# Patient Record
Sex: MALE | Race: WHITE | NOT HISPANIC OR LATINO | Employment: FULL TIME | URBAN - METROPOLITAN AREA
[De-identification: names, ages, dates, MRNs, and addresses within clinical notes are randomized per-mention and may not be internally consistent; named-entity substitution may affect disease eponyms.]

---

## 2017-01-01 ENCOUNTER — OFFICE VISIT (OUTPATIENT)
Dept: FAMILY MEDICINE | Facility: CLINIC | Age: 52
End: 2017-01-01
Payer: COMMERCIAL

## 2017-01-01 VITALS
TEMPERATURE: 99 F | HEART RATE: 110 BPM | WEIGHT: 133.81 LBS | DIASTOLIC BLOOD PRESSURE: 70 MMHG | SYSTOLIC BLOOD PRESSURE: 120 MMHG | OXYGEN SATURATION: 98 % | BODY MASS INDEX: 19.82 KG/M2 | HEIGHT: 69 IN

## 2017-01-01 DIAGNOSIS — I10 ESSENTIAL HYPERTENSION: Primary | ICD-10-CM

## 2017-01-01 DIAGNOSIS — I25.10 CORONARY ARTERY DISEASE INVOLVING NATIVE CORONARY ARTERY WITHOUT ANGINA PECTORIS, UNSPECIFIED WHETHER NATIVE OR TRANSPLANTED HEART: ICD-10-CM

## 2017-01-01 DIAGNOSIS — Z12.11 COLON CANCER SCREENING: ICD-10-CM

## 2017-01-01 DIAGNOSIS — I10 HYPERTENSION, UNSPECIFIED TYPE: ICD-10-CM

## 2017-01-01 DIAGNOSIS — I21.4 NSTEMI (NON-ST ELEVATED MYOCARDIAL INFARCTION): ICD-10-CM

## 2017-01-01 DIAGNOSIS — E78.5 HYPERLIPIDEMIA, UNSPECIFIED HYPERLIPIDEMIA TYPE: ICD-10-CM

## 2017-01-01 PROCEDURE — 99999 PR PBB SHADOW E&M-NEW PATIENT-LVL III: CPT | Mod: PBBFAC,,, | Performed by: NURSE PRACTITIONER

## 2017-01-01 PROCEDURE — 99203 OFFICE O/P NEW LOW 30 MIN: CPT | Mod: S$GLB,,, | Performed by: NURSE PRACTITIONER

## 2017-01-01 RX ORDER — ASPIRIN 81 MG/1
81 TABLET ORAL DAILY
Status: ON HOLD | COMMUNITY
End: 2018-01-01 | Stop reason: HOSPADM

## 2017-11-09 NOTE — PROGRESS NOTES
Subjective:       Patient ID: Teddy Caballero is a 51 y.o. male.    Chief Complaint: Establish Care (Doctor at work inform pt need primary physican to release pt back to work)    ####NEW PATIENT###    Patient is a 51 year old white male here today to establish care with PCP.  He states he needs a work note clearing him to return to work.  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    Patient reports he was admitted into hospital on 11/7/14 with NSTEMI.  Patient had Left Heart Cath with Dr. Wei on 11/10/17 with stent placement to LAD.  He was discharged home on ASA 81 mg daily, Effient 10 mg daily, Lisinopril 5 mg daily, Atorvastatin 80 mg daily and Lopressor 12.5 mg twice daily.  Patient reports he has not been on any medications for the past year.    Patient reports he has an appointment with Dr. Wei tomorrow - advised patient that he must get cardiac clearance from Dr. Wei to work and find out what medications Dr. Wei wants patient to be on and see if he will run blood work or not tomorrow.  I will get Dr. Wei's records from Nov. 2014 and will follow up with patient in couple weeks to review what Dr. Wei has planned.            Previous Medications    ASPIRIN (ECOTRIN) 81 MG EC TABLET    Take 81 mg by mouth once daily.    MULTIVITAMIN CAPSULE    Take 1 capsule by mouth once daily.       Past Medical History:   Diagnosis Date    Coronary artery disease 11/2014    stent x 1 in Nov. 2014    Hyperlipidemia     Hypertension        Past Surgical History:   Procedure Laterality Date    CORONARY ANGIOPLASTY WITH STENT PLACEMENT  11/2014    dr. Wei    stent placed  11/07/2014    on placed.       Family History   Problem Relation Age of Onset    Heart disease Mother      CABG x 3 vessels - 55    Diabetes Mother     Stroke Mother     Heart disease Father       CABG x 4 - age 40    Cancer Brother 29     colon,liver     Cancer Brother      gastric passed age 43       Social History     Social History    Marital status:      Spouse name: N/A    Number of children: N/A    Years of education: N/A     Occupational History    security at [employer]      Social History Main Topics    Smoking status: Current Every Day Smoker     Packs/day: 0.25     Years: 30.00     Types: Cigarettes    Smokeless tobacco: Never Used    Alcohol use Yes      Comment: every weekend - 4 beers    Drug use: No    Sexual activity: Not Asked     Other Topics Concern    None     Social History Narrative    None       Review of Systems   Constitutional: Negative for activity change, appetite change, fatigue, fever and unexpected weight change.   HENT: Negative for congestion, ear pain, mouth sores, nosebleeds, postnasal drip, rhinorrhea, sinus pressure, sneezing, sore throat, trouble swallowing and voice change.    Eyes: Negative.    Respiratory: Negative for cough, chest tightness and shortness of breath.    Cardiovascular: Negative for chest pain, palpitations and leg swelling.   Gastrointestinal: Negative.  Negative for abdominal pain, blood in stool, constipation, diarrhea, nausea and vomiting.   Endocrine: Negative.    Genitourinary: Negative for difficulty urinating, dysuria, flank pain, hematuria and urgency.   Musculoskeletal: Negative for arthralgias, back pain, gait problem, joint swelling, myalgias and neck pain.   Skin: Negative for color change, rash and wound.   Allergic/Immunologic: Negative for immunocompromised state.   Neurological: Negative for dizziness, tremors, seizures, syncope, speech difficulty and headaches.   Hematological: Negative for adenopathy. Does not bruise/bleed easily.   Psychiatric/Behavioral: Negative for behavioral problems, dysphoric mood, sleep disturbance and suicidal ideas. The patient is not nervous/anxious.          Objective:  "    Vitals:    11/09/17 1348   BP: 120/70   BP Location: Right arm   Patient Position: Sitting   BP Method: Medium (Manual)   Pulse: 110   Temp: 99 °F (37.2 °C)   TempSrc: Oral   SpO2: 98%   Weight: 60.7 kg (133 lb 13.1 oz)   Height: 5' 9" (1.753 m)          Physical Exam   Constitutional: He is oriented to person, place, and time. He appears well-developed and well-nourished.   HENT:   Head: Normocephalic.   Right Ear: External ear normal.   Left Ear: External ear normal.   Nose: Nose normal.   Mouth/Throat: Oropharynx is clear and moist. No oropharyngeal exudate.   Eyes: EOM are normal. Pupils are equal, round, and reactive to light. Right eye exhibits no discharge. Left eye exhibits no discharge. No scleral icterus.   Neck: Normal range of motion. Neck supple. No tracheal deviation present. No thyromegaly present.   Cardiovascular: Normal rate, regular rhythm and normal heart sounds.    No murmur heard.  Pulmonary/Chest: Effort normal and breath sounds normal. No respiratory distress.   Abdominal: Soft. He exhibits no distension.   Musculoskeletal: Normal range of motion. He exhibits no edema.   Lymphadenopathy:     He has no cervical adenopathy.   Neurological: He is alert and oriented to person, place, and time. Coordination normal.   Skin: Skin is warm and dry. No rash noted.   Psychiatric: He has a normal mood and affect. His behavior is normal.         Assessment:         ICD-10-CM ICD-9-CM   1. Essential hypertension I10 401.9   2. Coronary artery disease involving native coronary artery without angina pectoris, unspecified whether native or transplanted heart I25.10 414.01   3. Hyperlipidemia, unspecified hyperlipidemia type E78.5 272.4   4. NSTEMI (non-ST elevated myocardial infarction) I21.4 410.70   5. Hypertension, unspecified type I10 401.9       Plan:       Essential hypertension  -  Keep appointment with Dr. Wei IN AM - for evaluation and management of medications and cardiac clearance for " work.    Coronary artery disease involving native coronary artery without angina pectoris, unspecified whether native or transplanted heart  -  Keep appointment with Dr. Wei in AM for cardiac clearance for work.    Hyperlipidemia, unspecified hyperlipidemia type  -  I assume Dr. Wei will run labs tomorrow - I will reassess what Dr. Wei plans after tomorrow's visit.    NSTEMI (non-ST elevated myocardial infarction)  -  Had stent to LAD Nov. 2014          Return in about 4 weeks (around 12/7/2017) for follow up.     Patient's Medications   New Prescriptions    No medications on file   Previous Medications    ASPIRIN (ECOTRIN) 81 MG EC TABLET    Take 81 mg by mouth once daily.    MULTIVITAMIN CAPSULE    Take 1 capsule by mouth once daily.   Modified Medications    No medications on file   Discontinued Medications    HYDROCODONE-ACETAMINOPHEN 5-325MG (NORCO) 5-325 MG PER TABLET    Take 1 tablet by mouth every 4 (four) hours as needed for Pain.

## 2018-01-01 ENCOUNTER — TELEPHONE (OUTPATIENT)
Dept: ENDOSCOPY | Facility: HOSPITAL | Age: 53
End: 2018-01-01

## 2018-01-01 ENCOUNTER — TELEPHONE (OUTPATIENT)
Dept: HEMATOLOGY/ONCOLOGY | Facility: CLINIC | Age: 53
End: 2018-01-01

## 2018-01-01 ENCOUNTER — PATIENT MESSAGE (OUTPATIENT)
Dept: PULMONOLOGY | Facility: HOSPITAL | Age: 53
End: 2018-01-01

## 2018-01-01 ENCOUNTER — PATIENT MESSAGE (OUTPATIENT)
Dept: GASTROENTEROLOGY | Facility: CLINIC | Age: 53
End: 2018-01-01

## 2018-01-01 ENCOUNTER — SURGERY (OUTPATIENT)
Age: 53
End: 2018-01-01

## 2018-01-01 ENCOUNTER — OFFICE VISIT (OUTPATIENT)
Dept: GASTROENTEROLOGY | Facility: CLINIC | Age: 53
End: 2018-01-01
Payer: COMMERCIAL

## 2018-01-01 ENCOUNTER — PATIENT MESSAGE (OUTPATIENT)
Dept: FAMILY MEDICINE | Facility: CLINIC | Age: 53
End: 2018-01-01

## 2018-01-01 ENCOUNTER — LAB VISIT (OUTPATIENT)
Dept: LAB | Facility: HOSPITAL | Age: 53
End: 2018-01-01
Attending: INTERNAL MEDICINE
Payer: COMMERCIAL

## 2018-01-01 ENCOUNTER — CLINICAL SUPPORT (OUTPATIENT)
Dept: HEMATOLOGY/ONCOLOGY | Facility: CLINIC | Age: 53
End: 2018-01-01
Payer: COMMERCIAL

## 2018-01-01 ENCOUNTER — INFUSION (OUTPATIENT)
Dept: INFUSION THERAPY | Facility: HOSPITAL | Age: 53
End: 2018-01-01
Attending: INTERNAL MEDICINE
Payer: COMMERCIAL

## 2018-01-01 ENCOUNTER — TELEPHONE (OUTPATIENT)
Dept: GASTROENTEROLOGY | Facility: CLINIC | Age: 53
End: 2018-01-01

## 2018-01-01 ENCOUNTER — PATIENT MESSAGE (OUTPATIENT)
Dept: PULMONOLOGY | Facility: CLINIC | Age: 53
End: 2018-01-01

## 2018-01-01 ENCOUNTER — PATIENT MESSAGE (OUTPATIENT)
Dept: HEMATOLOGY/ONCOLOGY | Facility: CLINIC | Age: 53
End: 2018-01-01

## 2018-01-01 ENCOUNTER — OFFICE VISIT (OUTPATIENT)
Dept: HEMATOLOGY/ONCOLOGY | Facility: CLINIC | Age: 53
End: 2018-01-01
Payer: COMMERCIAL

## 2018-01-01 ENCOUNTER — HOSPITAL ENCOUNTER (OUTPATIENT)
Dept: RADIOLOGY | Facility: HOSPITAL | Age: 53
Discharge: HOME | End: 2018-02-26
Attending: INTERNAL MEDICINE
Payer: COMMERCIAL

## 2018-01-01 ENCOUNTER — DOCUMENTATION ONLY (OUTPATIENT)
Dept: HEMATOLOGY/ONCOLOGY | Facility: CLINIC | Age: 53
End: 2018-01-01

## 2018-01-01 ENCOUNTER — HOSPITAL ENCOUNTER (OUTPATIENT)
Facility: HOSPITAL | Age: 53
Discharge: HOME | End: 2018-03-06
Attending: INTERNAL MEDICINE | Admitting: INTERNAL MEDICINE
Payer: COMMERCIAL

## 2018-01-01 ENCOUNTER — OFFICE VISIT (OUTPATIENT)
Dept: FAMILY MEDICINE | Facility: CLINIC | Age: 53
End: 2018-01-01
Payer: COMMERCIAL

## 2018-01-01 ENCOUNTER — OFFICE VISIT (OUTPATIENT)
Dept: PULMONOLOGY | Facility: CLINIC | Age: 53
End: 2018-01-01
Payer: COMMERCIAL

## 2018-01-01 ENCOUNTER — HOSPITAL ENCOUNTER (INPATIENT)
Facility: HOSPITAL | Age: 53
LOS: 5 days | DRG: 871 | End: 2018-04-21
Attending: EMERGENCY MEDICINE | Admitting: INTERNAL MEDICINE
Payer: COMMERCIAL

## 2018-01-01 ENCOUNTER — RESEARCH ENCOUNTER (OUTPATIENT)
Dept: RESEARCH | Facility: HOSPITAL | Age: 53
End: 2018-01-01

## 2018-01-01 ENCOUNTER — ANESTHESIA EVENT (OUTPATIENT)
Dept: SURGERY | Facility: HOSPITAL | Age: 53
End: 2018-01-01
Payer: COMMERCIAL

## 2018-01-01 ENCOUNTER — ANESTHESIA (OUTPATIENT)
Dept: SURGERY | Facility: HOSPITAL | Age: 53
End: 2018-01-01
Payer: COMMERCIAL

## 2018-01-01 ENCOUNTER — HOSPITAL ENCOUNTER (OUTPATIENT)
Facility: HOSPITAL | Age: 53
Discharge: HOME | End: 2018-03-23
Attending: INTERNAL MEDICINE | Admitting: INTERNAL MEDICINE
Payer: COMMERCIAL

## 2018-01-01 ENCOUNTER — DOCUMENTATION ONLY (OUTPATIENT)
Dept: GASTROENTEROLOGY | Facility: CLINIC | Age: 53
End: 2018-01-01

## 2018-01-01 VITALS
WEIGHT: 87.31 LBS | DIASTOLIC BLOOD PRESSURE: 67 MMHG | OXYGEN SATURATION: 93 % | SYSTOLIC BLOOD PRESSURE: 96 MMHG | HEART RATE: 63 BPM | RESPIRATION RATE: 12 BRPM | HEIGHT: 69 IN | TEMPERATURE: 97 F | BODY MASS INDEX: 12.93 KG/M2

## 2018-01-01 VITALS
HEART RATE: 80 BPM | TEMPERATURE: 98 F | RESPIRATION RATE: 18 BRPM | DIASTOLIC BLOOD PRESSURE: 67 MMHG | WEIGHT: 99 LBS | HEIGHT: 69 IN | SYSTOLIC BLOOD PRESSURE: 109 MMHG | BODY MASS INDEX: 14.66 KG/M2 | OXYGEN SATURATION: 100 %

## 2018-01-01 VITALS
HEART RATE: 100 BPM | WEIGHT: 91.5 LBS | OXYGEN SATURATION: 100 % | HEIGHT: 69 IN | BODY MASS INDEX: 13.55 KG/M2 | SYSTOLIC BLOOD PRESSURE: 83 MMHG | RESPIRATION RATE: 18 BRPM | DIASTOLIC BLOOD PRESSURE: 54 MMHG | TEMPERATURE: 98 F

## 2018-01-01 VITALS
SYSTOLIC BLOOD PRESSURE: 108 MMHG | HEIGHT: 69 IN | HEART RATE: 110 BPM | BODY MASS INDEX: 15.67 KG/M2 | WEIGHT: 105.81 LBS | OXYGEN SATURATION: 99 % | DIASTOLIC BLOOD PRESSURE: 68 MMHG

## 2018-01-01 VITALS
RESPIRATION RATE: 16 BRPM | HEIGHT: 69 IN | SYSTOLIC BLOOD PRESSURE: 107 MMHG | TEMPERATURE: 98 F | WEIGHT: 108.69 LBS | DIASTOLIC BLOOD PRESSURE: 78 MMHG | HEART RATE: 96 BPM | BODY MASS INDEX: 16.1 KG/M2

## 2018-01-01 VITALS — HEIGHT: 69 IN | BODY MASS INDEX: 14.69 KG/M2 | WEIGHT: 99.19 LBS

## 2018-01-01 VITALS
DIASTOLIC BLOOD PRESSURE: 77 MMHG | WEIGHT: 118.63 LBS | HEIGHT: 69 IN | BODY MASS INDEX: 17.57 KG/M2 | HEART RATE: 91 BPM | SYSTOLIC BLOOD PRESSURE: 117 MMHG

## 2018-01-01 VITALS
HEART RATE: 102 BPM | TEMPERATURE: 99 F | DIASTOLIC BLOOD PRESSURE: 70 MMHG | WEIGHT: 123.44 LBS | SYSTOLIC BLOOD PRESSURE: 114 MMHG | BODY MASS INDEX: 18.28 KG/M2 | HEIGHT: 69 IN | OXYGEN SATURATION: 100 %

## 2018-01-01 VITALS
HEART RATE: 76 BPM | BODY MASS INDEX: 17.18 KG/M2 | SYSTOLIC BLOOD PRESSURE: 114 MMHG | WEIGHT: 116 LBS | HEIGHT: 69 IN | OXYGEN SATURATION: 100 % | DIASTOLIC BLOOD PRESSURE: 79 MMHG | TEMPERATURE: 97 F | HEART RATE: 90 BPM | SYSTOLIC BLOOD PRESSURE: 104 MMHG | RESPIRATION RATE: 18 BRPM | RESPIRATION RATE: 18 BRPM | TEMPERATURE: 98 F | DIASTOLIC BLOOD PRESSURE: 58 MMHG

## 2018-01-01 VITALS
BODY MASS INDEX: 14.69 KG/M2 | SYSTOLIC BLOOD PRESSURE: 95 MMHG | HEART RATE: 97 BPM | TEMPERATURE: 98 F | DIASTOLIC BLOOD PRESSURE: 63 MMHG | OXYGEN SATURATION: 99 % | HEIGHT: 69 IN | RESPIRATION RATE: 18 BRPM | WEIGHT: 99.19 LBS

## 2018-01-01 VITALS
HEART RATE: 77 BPM | SYSTOLIC BLOOD PRESSURE: 109 MMHG | TEMPERATURE: 98 F | DIASTOLIC BLOOD PRESSURE: 51 MMHG | RESPIRATION RATE: 14 BRPM

## 2018-01-01 DIAGNOSIS — Z51.11 ENCOUNTER FOR CHEMOTHERAPY MANAGEMENT: ICD-10-CM

## 2018-01-01 DIAGNOSIS — Z80.0 FAMILY HISTORY OF GASTRIC CANCER: ICD-10-CM

## 2018-01-01 DIAGNOSIS — C77.1 METASTATIC CANCER TO INTRATHORACIC LYMPH NODES: ICD-10-CM

## 2018-01-01 DIAGNOSIS — C80.1 ADENOCARCINOMA: ICD-10-CM

## 2018-01-01 DIAGNOSIS — R63.0 DECREASED APPETITE: ICD-10-CM

## 2018-01-01 DIAGNOSIS — Z71.3 NUTRITIONAL COUNSELING: Primary | ICD-10-CM

## 2018-01-01 DIAGNOSIS — G89.3 NEOPLASM RELATED PAIN (ACUTE) (CHRONIC): ICD-10-CM

## 2018-01-01 DIAGNOSIS — C34.92 NON-SMALL CELL CANCER OF LEFT LUNG: ICD-10-CM

## 2018-01-01 DIAGNOSIS — C78.7 LIVER METASTASIS: ICD-10-CM

## 2018-01-01 DIAGNOSIS — C79.70 MALIGNANT NEOPLASM METASTATIC TO ADRENAL GLAND, UNSPECIFIED LATERALITY: ICD-10-CM

## 2018-01-01 DIAGNOSIS — N28.89 RENAL MASS: ICD-10-CM

## 2018-01-01 DIAGNOSIS — R41.82 ALTERED MENTAL STATUS: ICD-10-CM

## 2018-01-01 DIAGNOSIS — C80.1 ADENOCARCINOMA: Primary | ICD-10-CM

## 2018-01-01 DIAGNOSIS — K59.00 CONSTIPATION, UNSPECIFIED CONSTIPATION TYPE: ICD-10-CM

## 2018-01-01 DIAGNOSIS — C34.92 NON-SMALL CELL CANCER OF LEFT LUNG: Primary | ICD-10-CM

## 2018-01-01 DIAGNOSIS — R10.9 ABDOMINAL PAIN, UNSPECIFIED ABDOMINAL LOCATION: Primary | ICD-10-CM

## 2018-01-01 DIAGNOSIS — R93.5 ABNORMAL CT OF THE ABDOMEN: Primary | ICD-10-CM

## 2018-01-01 DIAGNOSIS — R91.8 LUNG MASS: ICD-10-CM

## 2018-01-01 DIAGNOSIS — R63.4 WEIGHT LOSS: ICD-10-CM

## 2018-01-01 DIAGNOSIS — R91.8 LUNG MASS: Primary | ICD-10-CM

## 2018-01-01 DIAGNOSIS — R19.4 CHANGE IN BOWEL HABIT: ICD-10-CM

## 2018-01-01 DIAGNOSIS — K76.9 LIVER LESION: ICD-10-CM

## 2018-01-01 DIAGNOSIS — Z12.11 SPECIAL SCREENING FOR MALIGNANT NEOPLASMS, COLON: Primary | ICD-10-CM

## 2018-01-01 DIAGNOSIS — C34.90 NON-SMALL CELL LUNG CANCER, UNSPECIFIED LATERALITY: ICD-10-CM

## 2018-01-01 DIAGNOSIS — C78.7 LIVER METASTASIS: Primary | ICD-10-CM

## 2018-01-01 DIAGNOSIS — L89.91: Primary | ICD-10-CM

## 2018-01-01 DIAGNOSIS — G89.3 PAIN, CANCER: ICD-10-CM

## 2018-01-01 DIAGNOSIS — C78.7 LIVER METASTASES: ICD-10-CM

## 2018-01-01 DIAGNOSIS — R10.10 UPPER ABDOMINAL PAIN: ICD-10-CM

## 2018-01-01 DIAGNOSIS — Z80.0 FAMILY HISTORY OF COLON CANCER: ICD-10-CM

## 2018-01-01 DIAGNOSIS — D63.0 ANEMIA IN NEOPLASTIC DISEASE: ICD-10-CM

## 2018-01-01 DIAGNOSIS — H93.8X9 SENSATION OF FULLNESS IN EAR, UNSPECIFIED LATERALITY: Primary | ICD-10-CM

## 2018-01-01 DIAGNOSIS — E27.8 ADRENAL MASS: ICD-10-CM

## 2018-01-01 DIAGNOSIS — G89.3 NEOPLASM RELATED PAIN (ACUTE) (CHRONIC): Primary | ICD-10-CM

## 2018-01-01 DIAGNOSIS — R10.13 EPIGASTRIC PAIN: Primary | ICD-10-CM

## 2018-01-01 DIAGNOSIS — R59.0 PERIAORTIC LYMPHADENOPATHY: Primary | ICD-10-CM

## 2018-01-01 DIAGNOSIS — E43 SEVERE MALNUTRITION: ICD-10-CM

## 2018-01-01 DIAGNOSIS — R10.10 UPPER ABDOMINAL PAIN: Primary | ICD-10-CM

## 2018-01-01 DIAGNOSIS — E16.2 HYPOGLYCEMIA: Primary | ICD-10-CM

## 2018-01-01 DIAGNOSIS — N28.89 KIDNEY MASS: ICD-10-CM

## 2018-01-01 DIAGNOSIS — Z12.11 SCREEN FOR COLON CANCER: ICD-10-CM

## 2018-01-01 DIAGNOSIS — G89.3 PAIN, CANCER: Primary | ICD-10-CM

## 2018-01-01 DIAGNOSIS — C34.90 NON-SMALL CELL LUNG CANCER, UNSPECIFIED LATERALITY: Primary | ICD-10-CM

## 2018-01-01 LAB
ACETOHEXAMIDE SERPL-MCNC: NEGATIVE NG/ML
ALBUMIN SERPL BCP-MCNC: 1.8 G/DL
ALBUMIN SERPL BCP-MCNC: 1.9 G/DL
ALBUMIN SERPL BCP-MCNC: 2 G/DL
ALBUMIN SERPL BCP-MCNC: 2.3 G/DL
ALBUMIN SERPL BCP-MCNC: 2.5 G/DL
ALBUMIN SERPL BCP-MCNC: 2.9 G/DL
ALBUMIN SERPL BCP-MCNC: 3.4 G/DL
ALBUMIN SERPL BCP-MCNC: 3.4 G/DL
ALP SERPL-CCNC: 102 U/L
ALP SERPL-CCNC: 105 U/L
ALP SERPL-CCNC: 113 U/L
ALP SERPL-CCNC: 121 U/L
ALP SERPL-CCNC: 146 U/L
ALP SERPL-CCNC: 72 U/L
ALP SERPL-CCNC: 80 U/L
ALP SERPL-CCNC: 87 U/L
ALT SERPL W/O P-5'-P-CCNC: 10 U/L
ALT SERPL W/O P-5'-P-CCNC: 10 U/L
ALT SERPL W/O P-5'-P-CCNC: 12 U/L
ALT SERPL W/O P-5'-P-CCNC: 69 U/L
ALT SERPL W/O P-5'-P-CCNC: 73 U/L
ALT SERPL W/O P-5'-P-CCNC: 76 U/L
ALT SERPL W/O P-5'-P-CCNC: 78 U/L
ALT SERPL W/O P-5'-P-CCNC: 88 U/L
ANION GAP SERPL CALC-SCNC: 10 MMOL/L
ANION GAP SERPL CALC-SCNC: 11 MMOL/L
ANION GAP SERPL CALC-SCNC: 11 MMOL/L
ANION GAP SERPL CALC-SCNC: 5 MMOL/L
ANION GAP SERPL CALC-SCNC: 6 MMOL/L
ANION GAP SERPL CALC-SCNC: 7 MMOL/L
ANION GAP SERPL CALC-SCNC: 8 MMOL/L
ANISOCYTOSIS BLD QL SMEAR: SLIGHT
ANISOCYTOSIS BLD QL SMEAR: SLIGHT
ANNOTATION COMMENT IMP: NORMAL
APTT BLDCRRT: 27.4 SEC
AST SERPL-CCNC: 13 U/L
AST SERPL-CCNC: 16 U/L
AST SERPL-CCNC: 16 U/L
AST SERPL-CCNC: 29 U/L
AST SERPL-CCNC: 30 U/L
AST SERPL-CCNC: 31 U/L
AST SERPL-CCNC: 45 U/L
AST SERPL-CCNC: 78 U/L
B-OH-BUTYR BLD STRIP-SCNC: 0.1 MMOL/L
BACTERIA BLD CULT: NORMAL
BACTERIA BLD CULT: NORMAL
BASOPHILS # BLD AUTO: 0.01 K/UL
BASOPHILS # BLD AUTO: 0.02 K/UL
BASOPHILS NFR BLD: 0.1 %
BASOPHILS NFR BLD: 0.2 %
BILIRUB SERPL-MCNC: 0.5 MG/DL
BILIRUB SERPL-MCNC: 0.8 MG/DL
BILIRUB SERPL-MCNC: 0.9 MG/DL
BILIRUB SERPL-MCNC: 0.9 MG/DL
BILIRUB UR QL STRIP: NEGATIVE
BUN SERPL-MCNC: 33 MG/DL
BUN SERPL-MCNC: 45 MG/DL
BUN SERPL-MCNC: 47 MG/DL
BUN SERPL-MCNC: 48 MG/DL
BUN SERPL-MCNC: 53 MG/DL
BUN SERPL-MCNC: 58 MG/DL
BUN SERPL-MCNC: 75 MG/DL
BUN SERPL-MCNC: 80 MG/DL
BURR CELLS BLD QL SMEAR: ABNORMAL
BURR CELLS BLD QL SMEAR: ABNORMAL
C PEPTIDE SERPL-MCNC: 0.1 NG/ML
CALCIUM SERPL-MCNC: 11 MG/DL
CALCIUM SERPL-MCNC: 11 MG/DL
CALCIUM SERPL-MCNC: 7.3 MG/DL
CALCIUM SERPL-MCNC: 7.4 MG/DL
CALCIUM SERPL-MCNC: 7.5 MG/DL
CALCIUM SERPL-MCNC: 7.9 MG/DL
CALCIUM SERPL-MCNC: 9.8 MG/DL
CHLORIDE SERPL-SCNC: 100 MMOL/L
CHLORIDE SERPL-SCNC: 109 MMOL/L
CHLORIDE SERPL-SCNC: 111 MMOL/L
CHLORIDE SERPL-SCNC: 114 MMOL/L
CHLORIDE SERPL-SCNC: 115 MMOL/L
CHLORIDE SERPL-SCNC: 115 MMOL/L
CHLORIDE SERPL-SCNC: 116 MMOL/L
CHLORIDE SERPL-SCNC: 117 MMOL/L
CHLORIDE SERPL-SCNC: 91 MMOL/L
CHLORIDE SERPL-SCNC: 93 MMOL/L
CHLORPROPAMIDE SERPL-MCNC: NEGATIVE NG/ML
CLARITY UR REFRACT.AUTO: CLEAR
CO2 SERPL-SCNC: 18 MMOL/L
CO2 SERPL-SCNC: 19 MMOL/L
CO2 SERPL-SCNC: 20 MMOL/L
CO2 SERPL-SCNC: 20 MMOL/L
CO2 SERPL-SCNC: 21 MMOL/L
CO2 SERPL-SCNC: 25 MMOL/L
CO2 SERPL-SCNC: 27 MMOL/L
COLOR UR AUTO: YELLOW
CORTIS F SERPL-MCNC: 16.68 MCG/DL
CORTIS SERPL-MCNC: 29.5 UG/DL
CREAT SERPL-MCNC: 0.5 MG/DL
CREAT SERPL-MCNC: 0.7 MG/DL
CREAT SERPL-MCNC: 0.7 MG/DL
CREAT SERPL-MCNC: 0.8 MG/DL
CREAT SERPL-MCNC: 1.2 MG/DL
CREAT SERPL-MCNC: 1.4 MG/DL
DIFFERENTIAL METHOD: ABNORMAL
EOSINOPHIL # BLD AUTO: 0.1 K/UL
EOSINOPHIL # BLD AUTO: 0.3 K/UL
EOSINOPHIL NFR BLD: 0.6 %
EOSINOPHIL NFR BLD: 0.6 %
EOSINOPHIL NFR BLD: 0.8 %
EOSINOPHIL NFR BLD: 0.9 %
EOSINOPHIL NFR BLD: 2.7 %
ERYTHROCYTE [DISTWIDTH] IN BLOOD BY AUTOMATED COUNT: 16.2 %
ERYTHROCYTE [DISTWIDTH] IN BLOOD BY AUTOMATED COUNT: 16.3 %
ERYTHROCYTE [DISTWIDTH] IN BLOOD BY AUTOMATED COUNT: 17.5 %
ERYTHROCYTE [DISTWIDTH] IN BLOOD BY AUTOMATED COUNT: 18.8 %
ERYTHROCYTE [DISTWIDTH] IN BLOOD BY AUTOMATED COUNT: 18.9 %
ERYTHROCYTE [DISTWIDTH] IN BLOOD BY AUTOMATED COUNT: 19.3 %
ERYTHROCYTE [DISTWIDTH] IN BLOOD BY AUTOMATED COUNT: 19.4 %
ERYTHROCYTE [DISTWIDTH] IN BLOOD BY AUTOMATED COUNT: 19.6 %
EST. GFR  (AFRICAN AMERICAN): >60 ML/MIN/1.73 M^2
EST. GFR  (NON AFRICAN AMERICAN): 57.8 ML/MIN/1.73 M^2
EST. GFR  (NON AFRICAN AMERICAN): >60 ML/MIN/1.73 M^2
ESTIMATED AVG GLUCOSE: 97 MG/DL
FIBRINOGEN PPP-MCNC: 311 MG/DL
GLIMEPIRIDE SERPL-MCNC: NEGATIVE NG/ML
GLIPIZIDE SERPL-MCNC: NEGATIVE NG/ML
GLUCOSE SERPL-MCNC: 101 MG/DL
GLUCOSE SERPL-MCNC: 103 MG/DL
GLUCOSE SERPL-MCNC: 109 MG/DL (ref 70–110)
GLUCOSE SERPL-MCNC: 128 MG/DL (ref 70–110)
GLUCOSE SERPL-MCNC: 131 MG/DL
GLUCOSE SERPL-MCNC: 52 MG/DL
GLUCOSE SERPL-MCNC: 70 MG/DL
GLUCOSE SERPL-MCNC: 76 MG/DL
GLUCOSE SERPL-MCNC: 79 MG/DL
GLUCOSE SERPL-MCNC: 88 MG/DL
GLUCOSE SERPL-MCNC: 91 MG/DL
GLUCOSE SERPL-MCNC: 97 MG/DL
GLUCOSE UR QL STRIP: NEGATIVE
GLYBURIDE SERPL-MCNC: NEGATIVE NG/ML
HBA1C MFR BLD HPLC: 5 %
HCT VFR BLD AUTO: 33.1 %
HCT VFR BLD AUTO: 33.2 %
HCT VFR BLD AUTO: 35.5 %
HCT VFR BLD AUTO: 38 %
HCT VFR BLD AUTO: 38.9 %
HCT VFR BLD AUTO: 39.1 %
HCT VFR BLD AUTO: 39.6 %
HCT VFR BLD AUTO: 41.6 %
HGB BLD-MCNC: 11.5 G/DL
HGB BLD-MCNC: 11.9 G/DL
HGB BLD-MCNC: 12.3 G/DL
HGB BLD-MCNC: 13 G/DL
HGB BLD-MCNC: 13 G/DL
HGB BLD-MCNC: 13.6 G/DL
HGB BLD-MCNC: 13.7 G/DL
HGB BLD-MCNC: 14.2 G/DL
HGB UR QL STRIP: ABNORMAL
HYALINE CASTS UR QL AUTO: 16 /LPF
HYPOCHROMIA BLD QL SMEAR: ABNORMAL
HYPOCHROMIA BLD QL SMEAR: ABNORMAL
IGF-I SERPL-MCNC: <10 NG/ML (ref 37–245)
IGF-I Z-SCORE SERPL: <-2.33 SD
IMM GRANULOCYTES # BLD AUTO: 0.06 K/UL
IMM GRANULOCYTES # BLD AUTO: 0.06 K/UL
IMM GRANULOCYTES # BLD AUTO: 0.07 K/UL
IMM GRANULOCYTES # BLD AUTO: 0.11 K/UL
IMM GRANULOCYTES # BLD AUTO: 0.18 K/UL
IMM GRANULOCYTES # BLD AUTO: 0.19 K/UL
IMM GRANULOCYTES # BLD AUTO: 0.21 K/UL
IMM GRANULOCYTES # BLD AUTO: 0.27 K/UL
IMM GRANULOCYTES NFR BLD AUTO: 1 %
IMM GRANULOCYTES NFR BLD AUTO: 1.3 %
IMM GRANULOCYTES NFR BLD AUTO: 1.5 %
IMM GRANULOCYTES NFR BLD AUTO: 1.5 %
IMM GRANULOCYTES NFR BLD AUTO: 1.8 %
INR PPP: 1.3
INR PPP: 1.5
INSULIN COLLECTION INTERVAL: NORMAL
INSULIN SERPL-ACNC: <1 UU/ML
KETONES UR QL STRIP: NEGATIVE
LACTATE SERPL-SCNC: 1.3 MMOL/L
LEUKOCYTE ESTERASE UR QL STRIP: NEGATIVE
LYMPHOCYTES # BLD AUTO: 0.7 K/UL
LYMPHOCYTES # BLD AUTO: 0.8 K/UL
LYMPHOCYTES # BLD AUTO: 0.9 K/UL
LYMPHOCYTES # BLD AUTO: 0.9 K/UL
LYMPHOCYTES # BLD AUTO: 1.1 K/UL
LYMPHOCYTES NFR BLD: 5.9 %
LYMPHOCYTES NFR BLD: 6.3 %
LYMPHOCYTES NFR BLD: 6.6 %
LYMPHOCYTES NFR BLD: 6.6 %
LYMPHOCYTES NFR BLD: 7.5 %
MAGNESIUM SERPL-MCNC: 1.8 MG/DL
MAGNESIUM SERPL-MCNC: 1.8 MG/DL
MAGNESIUM SERPL-MCNC: 2 MG/DL
MAGNESIUM SERPL-MCNC: 2.1 MG/DL
MAGNESIUM SERPL-MCNC: 2.1 MG/DL
MCH RBC QN AUTO: 29.2 PG
MCH RBC QN AUTO: 30.2 PG
MCH RBC QN AUTO: 30.3 PG
MCH RBC QN AUTO: 30.9 PG
MCH RBC QN AUTO: 30.9 PG
MCH RBC QN AUTO: 31 PG
MCH RBC QN AUTO: 31 PG
MCH RBC QN AUTO: 31.1 PG
MCHC RBC AUTO-ENTMCNC: 33.4 G/DL
MCHC RBC AUTO-ENTMCNC: 34.1 G/DL
MCHC RBC AUTO-ENTMCNC: 34.2 G/DL
MCHC RBC AUTO-ENTMCNC: 34.3 G/DL
MCHC RBC AUTO-ENTMCNC: 34.6 G/DL
MCHC RBC AUTO-ENTMCNC: 34.7 G/DL
MCHC RBC AUTO-ENTMCNC: 35 G/DL
MCHC RBC AUTO-ENTMCNC: 35.8 G/DL
MCV RBC AUTO: 85 FL
MCV RBC AUTO: 86 FL
MCV RBC AUTO: 89 FL
MCV RBC AUTO: 91 FL
MCV RBC AUTO: 91 FL
MICROSCOPIC COMMENT: ABNORMAL
MONOCYTES # BLD AUTO: 0.4 K/UL
MONOCYTES # BLD AUTO: 0.4 K/UL
MONOCYTES # BLD AUTO: 0.5 K/UL
MONOCYTES NFR BLD: 3.1 %
MONOCYTES NFR BLD: 3.2 %
MONOCYTES NFR BLD: 3.7 %
MONOCYTES NFR BLD: 3.7 %
MONOCYTES NFR BLD: 4.2 %
NEUTROPHILS # BLD AUTO: 10 K/UL
NEUTROPHILS # BLD AUTO: 10.6 K/UL
NEUTROPHILS # BLD AUTO: 10.8 K/UL
NEUTROPHILS # BLD AUTO: 11.4 K/UL
NEUTROPHILS # BLD AUTO: 12.3 K/UL
NEUTROPHILS # BLD AUTO: 12.4 K/UL
NEUTROPHILS # BLD AUTO: 13.1 K/UL
NEUTROPHILS # BLD AUTO: 9.9 K/UL
NEUTROPHILS NFR BLD: 85.9 %
NEUTROPHILS NFR BLD: 87.1 %
NEUTROPHILS NFR BLD: 87.4 %
NEUTROPHILS NFR BLD: 87.5 %
NEUTROPHILS NFR BLD: 88 %
NITRITE UR QL STRIP: NEGATIVE
NRBC BLD-RTO: 0 /100 WBC
OVALOCYTES BLD QL SMEAR: ABNORMAL
OVALOCYTES BLD QL SMEAR: ABNORMAL
PH UR STRIP: 5 [PH] (ref 5–8)
PHOSPHATE SERPL-MCNC: 2.1 MG/DL
PHOSPHATE SERPL-MCNC: 2.2 MG/DL
PHOSPHATE SERPL-MCNC: 2.4 MG/DL
PHOSPHATE SERPL-MCNC: 2.6 MG/DL
PHOSPHATE SERPL-MCNC: 3.3 MG/DL
PLATELET # BLD AUTO: 108 K/UL
PLATELET # BLD AUTO: 113 K/UL
PLATELET # BLD AUTO: 301 K/UL
PLATELET # BLD AUTO: 371 K/UL
PLATELET # BLD AUTO: 449 K/UL
PLATELET # BLD AUTO: 60 K/UL
PLATELET # BLD AUTO: 69 K/UL
PLATELET # BLD AUTO: 71 K/UL
PMV BLD AUTO: 10.1 FL
PMV BLD AUTO: 10.8 FL
PMV BLD AUTO: 11.9 FL
PMV BLD AUTO: 12.1 FL
PMV BLD AUTO: 12.5 FL
PMV BLD AUTO: 12.5 FL
PMV BLD AUTO: 9.7 FL
PMV BLD AUTO: ABNORMAL FL
POCT GLUCOSE: 100 MG/DL (ref 70–110)
POCT GLUCOSE: 102 MG/DL (ref 70–110)
POCT GLUCOSE: 104 MG/DL (ref 70–110)
POCT GLUCOSE: 104 MG/DL (ref 70–110)
POCT GLUCOSE: 105 MG/DL (ref 70–110)
POCT GLUCOSE: 112 MG/DL (ref 70–110)
POCT GLUCOSE: 112 MG/DL (ref 70–110)
POCT GLUCOSE: 115 MG/DL (ref 70–110)
POCT GLUCOSE: 127 MG/DL (ref 70–110)
POCT GLUCOSE: 127 MG/DL (ref 70–110)
POCT GLUCOSE: 132 MG/DL (ref 70–110)
POCT GLUCOSE: 138 MG/DL (ref 70–110)
POCT GLUCOSE: 139 MG/DL (ref 70–110)
POCT GLUCOSE: 146 MG/DL (ref 70–110)
POCT GLUCOSE: 175 MG/DL (ref 70–110)
POCT GLUCOSE: 176 MG/DL (ref 70–110)
POCT GLUCOSE: 252 MG/DL (ref 70–110)
POCT GLUCOSE: 43 MG/DL (ref 70–110)
POCT GLUCOSE: 46 MG/DL (ref 70–110)
POCT GLUCOSE: 50 MG/DL (ref 70–110)
POCT GLUCOSE: 58 MG/DL (ref 70–110)
POCT GLUCOSE: 58 MG/DL (ref 70–110)
POCT GLUCOSE: 59 MG/DL (ref 70–110)
POCT GLUCOSE: 59 MG/DL (ref 70–110)
POCT GLUCOSE: 61 MG/DL (ref 70–110)
POCT GLUCOSE: 62 MG/DL (ref 70–110)
POCT GLUCOSE: 63 MG/DL (ref 70–110)
POCT GLUCOSE: 66 MG/DL (ref 70–110)
POCT GLUCOSE: 67 MG/DL (ref 70–110)
POCT GLUCOSE: 70 MG/DL (ref 70–110)
POCT GLUCOSE: 71 MG/DL (ref 70–110)
POCT GLUCOSE: 72 MG/DL (ref 70–110)
POCT GLUCOSE: 73 MG/DL (ref 70–110)
POCT GLUCOSE: 73 MG/DL (ref 70–110)
POCT GLUCOSE: 75 MG/DL (ref 70–110)
POCT GLUCOSE: 75 MG/DL (ref 70–110)
POCT GLUCOSE: 76 MG/DL (ref 70–110)
POCT GLUCOSE: 80 MG/DL (ref 70–110)
POCT GLUCOSE: 81 MG/DL (ref 70–110)
POCT GLUCOSE: 82 MG/DL (ref 70–110)
POCT GLUCOSE: 83 MG/DL (ref 70–110)
POCT GLUCOSE: 84 MG/DL (ref 70–110)
POCT GLUCOSE: 84 MG/DL (ref 70–110)
POCT GLUCOSE: 85 MG/DL (ref 70–110)
POCT GLUCOSE: 86 MG/DL (ref 70–110)
POCT GLUCOSE: 87 MG/DL (ref 70–110)
POCT GLUCOSE: 88 MG/DL (ref 70–110)
POCT GLUCOSE: 88 MG/DL (ref 70–110)
POCT GLUCOSE: 89 MG/DL (ref 70–110)
POCT GLUCOSE: 94 MG/DL (ref 70–110)
POCT GLUCOSE: 98 MG/DL (ref 70–110)
POIKILOCYTOSIS BLD QL SMEAR: SLIGHT
POIKILOCYTOSIS BLD QL SMEAR: SLIGHT
POLYCHROMASIA BLD QL SMEAR: ABNORMAL
POLYCHROMASIA BLD QL SMEAR: ABNORMAL
POTASSIUM SERPL-SCNC: 3.6 MMOL/L
POTASSIUM SERPL-SCNC: 3.7 MMOL/L
POTASSIUM SERPL-SCNC: 3.7 MMOL/L
POTASSIUM SERPL-SCNC: 3.8 MMOL/L
POTASSIUM SERPL-SCNC: 3.8 MMOL/L
POTASSIUM SERPL-SCNC: 3.9 MMOL/L
POTASSIUM SERPL-SCNC: 4 MMOL/L
POTASSIUM SERPL-SCNC: 4.5 MMOL/L
POTASSIUM SERPL-SCNC: 5.5 MMOL/L
POTASSIUM SERPL-SCNC: 5.5 MMOL/L
PROINSULIN SERPL-SCNC: 2.2 PMOL/L (ref 3.6–22)
PROT SERPL-MCNC: 4.3 G/DL
PROT SERPL-MCNC: 4.4 G/DL
PROT SERPL-MCNC: 4.6 G/DL
PROT SERPL-MCNC: 4.8 G/DL
PROT SERPL-MCNC: 5.4 G/DL
PROT SERPL-MCNC: 6.6 G/DL
PROT SERPL-MCNC: 7.9 G/DL
PROT SERPL-MCNC: 8.2 G/DL
PROT UR QL STRIP: NEGATIVE
PROTHROMBIN TIME: 13.4 SEC
PROTHROMBIN TIME: 14.7 SEC
RBC # BLD AUTO: 3.71 M/UL
RBC # BLD AUTO: 3.85 M/UL
RBC # BLD AUTO: 3.98 M/UL
RBC # BLD AUTO: 4.18 M/UL
RBC # BLD AUTO: 4.3 M/UL
RBC # BLD AUTO: 4.42 M/UL
RBC # BLD AUTO: 4.65 M/UL
RBC # BLD AUTO: 4.68 M/UL
RBC #/AREA URNS AUTO: 2 /HPF (ref 0–4)
REPAGLINIDE SERPL-MCNC: NEGATIVE NG/ML
SCHISTOCYTES BLD QL SMEAR: ABNORMAL
SCHISTOCYTES BLD QL SMEAR: ABNORMAL
SODIUM SERPL-SCNC: 127 MMOL/L
SODIUM SERPL-SCNC: 131 MMOL/L
SODIUM SERPL-SCNC: 131 MMOL/L
SODIUM SERPL-SCNC: 136 MMOL/L
SODIUM SERPL-SCNC: 137 MMOL/L
SODIUM SERPL-SCNC: 138 MMOL/L
SODIUM SERPL-SCNC: 139 MMOL/L
SODIUM SERPL-SCNC: 140 MMOL/L
SODIUM SERPL-SCNC: 141 MMOL/L
SODIUM SERPL-SCNC: 141 MMOL/L
SP GR UR STRIP: 1.02 (ref 1–1.03)
T4 FREE SERPL-MCNC: 1.03 NG/DL
TOLAZAMIDE SERPL-MCNC: NORMAL NG/ML
TOLBUTAMIDE SERPL-MCNC: NEGATIVE NG/ML
TSH SERPL DL<=0.005 MIU/L-ACNC: 0.3 UIU/ML
URATE CRY UR QL COMP ASSIST: ABNORMAL
URN SPEC COLLECT METH UR: ABNORMAL
UROBILINOGEN UR STRIP-ACNC: NEGATIVE EU/DL
WBC # BLD AUTO: 11.53 K/UL
WBC # BLD AUTO: 12.12 K/UL
WBC # BLD AUTO: 13.05 K/UL
WBC # BLD AUTO: 14.11 K/UL
WBC # BLD AUTO: 14.2 K/UL
WBC # BLD AUTO: 14.34 K/UL
WBC # BLD AUTO: 14.76 K/UL
WBC # BLD AUTO: 14.84 K/UL
WBC #/AREA URNS AUTO: 0 /HPF (ref 0–5)

## 2018-01-01 PROCEDURE — 96375 TX/PRO/DX INJ NEW DRUG ADDON: CPT

## 2018-01-01 PROCEDURE — 99205 OFFICE O/P NEW HI 60 MIN: CPT | Mod: S$GLB,,, | Performed by: INTERNAL MEDICINE

## 2018-01-01 PROCEDURE — 99215 OFFICE O/P EST HI 40 MIN: CPT | Mod: S$GLB,,, | Performed by: INTERNAL MEDICINE

## 2018-01-01 PROCEDURE — 71250 CT THORAX DX C-: CPT | Mod: 26,,, | Performed by: RADIOLOGY

## 2018-01-01 PROCEDURE — 31625 BRONCHOSCOPY W/BIOPSY(S): CPT | Mod: 59,LT,, | Performed by: INTERNAL MEDICINE

## 2018-01-01 PROCEDURE — 80048 BASIC METABOLIC PNL TOTAL CA: CPT

## 2018-01-01 PROCEDURE — 20600001 HC STEP DOWN PRIVATE ROOM

## 2018-01-01 PROCEDURE — 92610 EVALUATE SWALLOWING FUNCTION: CPT

## 2018-01-01 PROCEDURE — 99233 SBSQ HOSP IP/OBS HIGH 50: CPT | Mod: ,,, | Performed by: INTERNAL MEDICINE

## 2018-01-01 PROCEDURE — 63600175 PHARM REV CODE 636 W HCPCS: Performed by: EMERGENCY MEDICINE

## 2018-01-01 PROCEDURE — S5010 5% DEXTROSE AND 0.45% SALINE: HCPCS | Performed by: STUDENT IN AN ORGANIZED HEALTH CARE EDUCATION/TRAINING PROGRAM

## 2018-01-01 PROCEDURE — 25000003 PHARM REV CODE 250: Performed by: STUDENT IN AN ORGANIZED HEALTH CARE EDUCATION/TRAINING PROGRAM

## 2018-01-01 PROCEDURE — 99999 PR PBB SHADOW E&M-EST. PATIENT-LVL III: CPT | Mod: PBBFAC,,, | Performed by: INTERNAL MEDICINE

## 2018-01-01 PROCEDURE — 82530 CORTISOL FREE: CPT

## 2018-01-01 PROCEDURE — 83605 ASSAY OF LACTIC ACID: CPT

## 2018-01-01 PROCEDURE — 3008F BODY MASS INDEX DOCD: CPT | Mod: S$GLB,,, | Performed by: NURSE PRACTITIONER

## 2018-01-01 PROCEDURE — 25500020 PHARM REV CODE 255: Performed by: INTERNAL MEDICINE

## 2018-01-01 PROCEDURE — 3008F BODY MASS INDEX DOCD: CPT | Mod: S$GLB,,, | Performed by: INTERNAL MEDICINE

## 2018-01-01 PROCEDURE — 27100171 HC OXYGEN HIGH FLOW UP TO 24 HOURS

## 2018-01-01 PROCEDURE — 83735 ASSAY OF MAGNESIUM: CPT

## 2018-01-01 PROCEDURE — 99999 PR PBB SHADOW E&M-EST. PATIENT-LVL IV: CPT | Mod: PBBFAC,,, | Performed by: NURSE PRACTITIONER

## 2018-01-01 PROCEDURE — 80053 COMPREHEN METABOLIC PANEL: CPT

## 2018-01-01 PROCEDURE — 97162 PT EVAL MOD COMPLEX 30 MIN: CPT

## 2018-01-01 PROCEDURE — 83519 RIA NONANTIBODY: CPT

## 2018-01-01 PROCEDURE — 97802 MEDICAL NUTRITION INDIV IN: CPT | Mod: S$GLB,,, | Performed by: DIETITIAN, REGISTERED

## 2018-01-01 PROCEDURE — 85027 COMPLETE CBC AUTOMATED: CPT

## 2018-01-01 PROCEDURE — 36415 COLL VENOUS BLD VENIPUNCTURE: CPT

## 2018-01-01 PROCEDURE — 84681 ASSAY OF C-PEPTIDE: CPT

## 2018-01-01 PROCEDURE — 84100 ASSAY OF PHOSPHORUS: CPT

## 2018-01-01 PROCEDURE — 84443 ASSAY THYROID STIM HORMONE: CPT

## 2018-01-01 PROCEDURE — 99291 CRITICAL CARE FIRST HOUR: CPT | Mod: ,,, | Performed by: EMERGENCY MEDICINE

## 2018-01-01 PROCEDURE — 25000003 PHARM REV CODE 250: Performed by: EMERGENCY MEDICINE

## 2018-01-01 PROCEDURE — 25000003 PHARM REV CODE 250: Performed by: FAMILY MEDICINE

## 2018-01-01 PROCEDURE — 84206 ASSAY OF PROINSULIN: CPT

## 2018-01-01 PROCEDURE — 63600175 PHARM REV CODE 636 W HCPCS: Performed by: INTERNAL MEDICINE

## 2018-01-01 PROCEDURE — 27800903 OPTIME MED/SURG SUP & DEVICES OTHER IMPLANTS: Performed by: INTERNAL MEDICINE

## 2018-01-01 PROCEDURE — 25000003 PHARM REV CODE 250: Performed by: NURSE ANESTHETIST, CERTIFIED REGISTERED

## 2018-01-01 PROCEDURE — 63600175 PHARM REV CODE 636 W HCPCS: Performed by: STUDENT IN AN ORGANIZED HEALTH CARE EDUCATION/TRAINING PROGRAM

## 2018-01-01 PROCEDURE — 80307 DRUG TEST PRSMV CHEM ANLYZR: CPT

## 2018-01-01 PROCEDURE — 99900035 HC TECH TIME PER 15 MIN (STAT)

## 2018-01-01 PROCEDURE — 85025 COMPLETE CBC W/AUTO DIFF WBC: CPT

## 2018-01-01 PROCEDURE — 25000003 PHARM REV CODE 250: Performed by: NURSE PRACTITIONER

## 2018-01-01 PROCEDURE — 88177 CYTP FNA EVAL EA ADDL: CPT | Mod: 26,,, | Performed by: PATHOLOGY

## 2018-01-01 PROCEDURE — D9220A PRA ANESTHESIA: Mod: ANES,,, | Performed by: ANESTHESIOLOGY

## 2018-01-01 PROCEDURE — 93005 ELECTROCARDIOGRAM TRACING: CPT

## 2018-01-01 PROCEDURE — 88305 TISSUE EXAM BY PATHOLOGIST: CPT | Performed by: PATHOLOGY

## 2018-01-01 PROCEDURE — 82010 KETONE BODYS QUAN: CPT

## 2018-01-01 PROCEDURE — 99291 CRITICAL CARE FIRST HOUR: CPT | Mod: 25

## 2018-01-01 PROCEDURE — 99244 OFF/OP CNSLTJ NEW/EST MOD 40: CPT | Mod: S$GLB,,, | Performed by: INTERNAL MEDICINE

## 2018-01-01 PROCEDURE — 84439 ASSAY OF FREE THYROXINE: CPT

## 2018-01-01 PROCEDURE — 71250 CT THORAX DX C-: CPT | Mod: TC

## 2018-01-01 PROCEDURE — 99213 OFFICE O/P EST LOW 20 MIN: CPT | Mod: S$GLB,,, | Performed by: NURSE PRACTITIONER

## 2018-01-01 PROCEDURE — 99204 OFFICE O/P NEW MOD 45 MIN: CPT | Mod: S$GLB,,, | Performed by: NURSE PRACTITIONER

## 2018-01-01 PROCEDURE — 36000707: Performed by: INTERNAL MEDICINE

## 2018-01-01 PROCEDURE — 93010 ELECTROCARDIOGRAM REPORT: CPT | Mod: ,,, | Performed by: INTERNAL MEDICINE

## 2018-01-01 PROCEDURE — 99232 SBSQ HOSP IP/OBS MODERATE 35: CPT | Mod: ,,, | Performed by: INTERNAL MEDICINE

## 2018-01-01 PROCEDURE — 27201423 OPTIME MED/SURG SUP & DEVICES STERILE SUPPLY: Performed by: INTERNAL MEDICINE

## 2018-01-01 PROCEDURE — 31652 BRONCH EBUS SAMPLNG 1/2 NODE: CPT | Mod: ,,, | Performed by: INTERNAL MEDICINE

## 2018-01-01 PROCEDURE — G0378 HOSPITAL OBSERVATION PER HR: HCPCS

## 2018-01-01 PROCEDURE — 27000221 HC OXYGEN, UP TO 24 HOURS

## 2018-01-01 PROCEDURE — 71000015 HC POSTOP RECOV 1ST HR: Performed by: INTERNAL MEDICINE

## 2018-01-01 PROCEDURE — 63600175 PHARM REV CODE 636 W HCPCS: Performed by: NURSE ANESTHETIST, CERTIFIED REGISTERED

## 2018-01-01 PROCEDURE — 94761 N-INVAS EAR/PLS OXIMETRY MLT: CPT

## 2018-01-01 PROCEDURE — 96374 THER/PROPH/DIAG INJ IV PUSH: CPT | Mod: 59

## 2018-01-01 PROCEDURE — 99222 1ST HOSP IP/OBS MODERATE 55: CPT | Mod: ,,, | Performed by: INTERNAL MEDICINE

## 2018-01-01 PROCEDURE — 88173 CYTOPATH EVAL FNA REPORT: CPT | Mod: 26,,, | Performed by: PATHOLOGY

## 2018-01-01 PROCEDURE — 97803 MED NUTRITION INDIV SUBSEQ: CPT

## 2018-01-01 PROCEDURE — 84305 ASSAY OF SOMATOMEDIN: CPT

## 2018-01-01 PROCEDURE — 63600175 PHARM REV CODE 636 W HCPCS: Performed by: RADIOLOGY

## 2018-01-01 PROCEDURE — 96365 THER/PROPH/DIAG IV INF INIT: CPT

## 2018-01-01 PROCEDURE — 81001 URINALYSIS AUTO W/SCOPE: CPT

## 2018-01-01 PROCEDURE — A4216 STERILE WATER/SALINE, 10 ML: HCPCS | Performed by: INTERNAL MEDICINE

## 2018-01-01 PROCEDURE — 82962 GLUCOSE BLOOD TEST: CPT

## 2018-01-01 PROCEDURE — 88172 CYTP DX EVAL FNA 1ST EA SITE: CPT | Mod: 26,,, | Performed by: PATHOLOGY

## 2018-01-01 PROCEDURE — 83735 ASSAY OF MAGNESIUM: CPT | Mod: 91

## 2018-01-01 PROCEDURE — 87040 BLOOD CULTURE FOR BACTERIA: CPT | Mod: 59

## 2018-01-01 PROCEDURE — 25000003 PHARM REV CODE 250: Performed by: INTERNAL MEDICINE

## 2018-01-01 PROCEDURE — D9220A PRA ANESTHESIA: Mod: CRNA,,, | Performed by: NURSE ANESTHETIST, CERTIFIED REGISTERED

## 2018-01-01 PROCEDURE — 36000706: Performed by: INTERNAL MEDICINE

## 2018-01-01 PROCEDURE — 88305 TISSUE EXAM BY PATHOLOGIST: CPT | Mod: 26,,, | Performed by: PATHOLOGY

## 2018-01-01 PROCEDURE — 97802 MEDICAL NUTRITION INDIV IN: CPT

## 2018-01-01 PROCEDURE — 85610 PROTHROMBIN TIME: CPT

## 2018-01-01 PROCEDURE — 88342 IMHCHEM/IMCYTCHM 1ST ANTB: CPT | Mod: 26,,, | Performed by: PATHOLOGY

## 2018-01-01 PROCEDURE — 85384 FIBRINOGEN ACTIVITY: CPT

## 2018-01-01 PROCEDURE — 97530 THERAPEUTIC ACTIVITIES: CPT

## 2018-01-01 PROCEDURE — 63600175 PHARM REV CODE 636 W HCPCS: Mod: JG | Performed by: INTERNAL MEDICINE

## 2018-01-01 PROCEDURE — 83036 HEMOGLOBIN GLYCOSYLATED A1C: CPT

## 2018-01-01 PROCEDURE — 88341 IMHCHEM/IMCYTCHM EA ADD ANTB: CPT | Mod: 26,,, | Performed by: PATHOLOGY

## 2018-01-01 PROCEDURE — 37000009 HC ANESTHESIA EA ADD 15 MINS: Performed by: INTERNAL MEDICINE

## 2018-01-01 PROCEDURE — A9585 GADOBUTROL INJECTION: HCPCS | Performed by: INTERNAL MEDICINE

## 2018-01-01 PROCEDURE — 96413 CHEMO IV INFUSION 1 HR: CPT

## 2018-01-01 PROCEDURE — 83525 ASSAY OF INSULIN: CPT

## 2018-01-01 PROCEDURE — 85730 THROMBOPLASTIN TIME PARTIAL: CPT

## 2018-01-01 PROCEDURE — 99999 PR PBB SHADOW E&M-EST. PATIENT-LVL II: CPT | Mod: PBBFAC,,,

## 2018-01-01 PROCEDURE — 99406 BEHAV CHNG SMOKING 3-10 MIN: CPT

## 2018-01-01 PROCEDURE — 96361 HYDRATE IV INFUSION ADD-ON: CPT

## 2018-01-01 PROCEDURE — 99223 1ST HOSP IP/OBS HIGH 75: CPT | Mod: AI,,, | Performed by: INTERNAL MEDICINE

## 2018-01-01 PROCEDURE — 37000008 HC ANESTHESIA 1ST 15 MINUTES: Performed by: INTERNAL MEDICINE

## 2018-01-01 PROCEDURE — 63600175 PHARM REV CODE 636 W HCPCS: Performed by: FAMILY MEDICINE

## 2018-01-01 PROCEDURE — 71000044 HC DOSC ROUTINE RECOVERY FIRST HOUR: Performed by: INTERNAL MEDICINE

## 2018-01-01 PROCEDURE — 82533 TOTAL CORTISOL: CPT

## 2018-01-01 RX ORDER — ENOXAPARIN SODIUM 100 MG/ML
40 INJECTION SUBCUTANEOUS EVERY 24 HOURS
Status: DISCONTINUED | OUTPATIENT
Start: 2018-01-01 | End: 2018-01-01

## 2018-01-01 RX ORDER — CIPROFLOXACIN 250 MG/5ML
500 KIT ORAL EVERY 12 HOURS
Status: DISCONTINUED | OUTPATIENT
Start: 2018-01-01 | End: 2018-01-01

## 2018-01-01 RX ORDER — AMOXICILLIN 500 MG
CAPSULE ORAL DAILY
Status: ON HOLD | COMMUNITY
End: 2018-01-01 | Stop reason: HOSPADM

## 2018-01-01 RX ORDER — CYPROHEPTADINE HYDROCHLORIDE 4 MG/1
4 TABLET ORAL 3 TIMES DAILY PRN
Qty: 90 TABLET | Refills: 0 | Status: ON HOLD | OUTPATIENT
Start: 2018-01-01 | End: 2018-01-01 | Stop reason: HOSPADM

## 2018-01-01 RX ORDER — DEXTROSE MONOHYDRATE AND SODIUM CHLORIDE 5; .9 G/100ML; G/100ML
INJECTION, SOLUTION INTRAVENOUS CONTINUOUS
Status: DISCONTINUED | OUTPATIENT
Start: 2018-01-01 | End: 2018-01-01

## 2018-01-01 RX ORDER — LIDOCAINE HYDROCHLORIDE 10 MG/ML
INJECTION, SOLUTION EPIDURAL; INFILTRATION; INTRACAUDAL; PERINEURAL
Status: DISCONTINUED | OUTPATIENT
Start: 2018-01-01 | End: 2018-01-01 | Stop reason: HOSPADM

## 2018-01-01 RX ORDER — PREDNISONE 5 MG/ML
20 SOLUTION ORAL DAILY
Status: DISCONTINUED | OUTPATIENT
Start: 2018-01-01 | End: 2018-01-01

## 2018-01-01 RX ORDER — MORPHINE SULFATE 15 MG/1
15 TABLET, FILM COATED, EXTENDED RELEASE ORAL 2 TIMES DAILY
Qty: 30 TABLET | Refills: 0 | Status: SHIPPED | OUTPATIENT
Start: 2018-01-01 | End: 2018-01-01 | Stop reason: SDUPTHER

## 2018-01-01 RX ORDER — SODIUM CHLORIDE 0.9 % (FLUSH) 0.9 %
10 SYRINGE (ML) INJECTION
Status: CANCELLED | OUTPATIENT
Start: 2018-01-01

## 2018-01-01 RX ORDER — ALPRAZOLAM 0.5 MG/1
0.5 TABLET ORAL NIGHTLY PRN
Qty: 30 TABLET | Refills: 0 | Status: SHIPPED | OUTPATIENT
Start: 2018-01-01 | End: 2018-01-01 | Stop reason: SDUPTHER

## 2018-01-01 RX ORDER — MIDAZOLAM HYDROCHLORIDE 1 MG/ML
1 INJECTION INTRAMUSCULAR; INTRAVENOUS
Status: DISCONTINUED | OUTPATIENT
Start: 2018-01-01 | End: 2018-01-01 | Stop reason: HOSPADM

## 2018-01-01 RX ORDER — ATORVASTATIN CALCIUM 40 MG/1
40 TABLET, FILM COATED ORAL DAILY
Status: ON HOLD | COMMUNITY
End: 2018-01-01 | Stop reason: HOSPADM

## 2018-01-01 RX ORDER — OXYCODONE AND ACETAMINOPHEN 10; 325 MG/1; MG/1
1 TABLET ORAL EVERY 4 HOURS PRN
Qty: 120 TABLET | Refills: 0 | Status: ON HOLD | OUTPATIENT
Start: 2018-01-01 | End: 2018-01-01 | Stop reason: HOSPADM

## 2018-01-01 RX ORDER — SODIUM CHLORIDE 9 MG/ML
INJECTION, SOLUTION INTRAVENOUS CONTINUOUS
Status: CANCELLED
Start: 2018-01-01

## 2018-01-01 RX ORDER — SODIUM CHLORIDE 0.9 % (FLUSH) 0.9 %
5 SYRINGE (ML) INJECTION
Status: DISCONTINUED | OUTPATIENT
Start: 2018-01-01 | End: 2018-01-01 | Stop reason: HOSPADM

## 2018-01-01 RX ORDER — DEXTROSE MONOHYDRATE AND SODIUM CHLORIDE 5; .45 G/100ML; G/100ML
INJECTION, SOLUTION INTRAVENOUS CONTINUOUS
Status: DISCONTINUED | OUTPATIENT
Start: 2018-01-01 | End: 2018-01-01

## 2018-01-01 RX ORDER — SODIUM CHLORIDE 9 MG/ML
500 INJECTION, SOLUTION INTRAVENOUS ONCE
Status: COMPLETED | OUTPATIENT
Start: 2018-01-01 | End: 2018-01-01

## 2018-01-01 RX ORDER — PREDNISONE 20 MG/1
20 TABLET ORAL DAILY
Status: DISCONTINUED | OUTPATIENT
Start: 2018-01-01 | End: 2018-01-01

## 2018-01-01 RX ORDER — DICYCLOMINE HYDROCHLORIDE 10 MG/1
10 CAPSULE ORAL 4 TIMES DAILY PRN
Qty: 40 CAPSULE | Refills: 0 | Status: SHIPPED | OUTPATIENT
Start: 2018-01-01 | End: 2018-01-01 | Stop reason: SDUPTHER

## 2018-01-01 RX ORDER — DICYCLOMINE HYDROCHLORIDE 10 MG/1
10 CAPSULE ORAL 4 TIMES DAILY PRN
Qty: 120 CAPSULE | Refills: 0 | Status: SHIPPED | OUTPATIENT
Start: 2018-01-01 | End: 2018-01-01

## 2018-01-01 RX ORDER — GADOBUTROL 604.72 MG/ML
4 INJECTION INTRAVENOUS
Status: COMPLETED | OUTPATIENT
Start: 2018-01-01 | End: 2018-01-01

## 2018-01-01 RX ORDER — SODIUM, POTASSIUM,MAG SULFATES 17.5-3.13G
1 SOLUTION, RECONSTITUTED, ORAL ORAL ONCE
Qty: 1 BOTTLE | Refills: 0 | Status: SHIPPED | OUTPATIENT
Start: 2018-01-01 | End: 2018-01-01

## 2018-01-01 RX ORDER — ALPRAZOLAM 0.5 MG/1
0.5 TABLET ORAL 2 TIMES DAILY PRN
Qty: 60 TABLET | Refills: 0 | Status: ON HOLD | OUTPATIENT
Start: 2018-01-01 | End: 2018-01-01 | Stop reason: HOSPADM

## 2018-01-01 RX ORDER — FENTANYL CITRATE 50 UG/ML
INJECTION, SOLUTION INTRAMUSCULAR; INTRAVENOUS
Status: DISCONTINUED | OUTPATIENT
Start: 2018-01-01 | End: 2018-01-01

## 2018-01-01 RX ORDER — MORPHINE SULFATE 2 MG/ML
1 INJECTION, SOLUTION INTRAMUSCULAR; INTRAVENOUS
Status: DISCONTINUED | OUTPATIENT
Start: 2018-01-01 | End: 2018-01-01 | Stop reason: HOSPADM

## 2018-01-01 RX ORDER — PREDNISONE 10 MG/1
10 TABLET ORAL ONCE
Status: DISCONTINUED | OUTPATIENT
Start: 2018-01-01 | End: 2018-01-01

## 2018-01-01 RX ORDER — CIPROFLOXACIN 500 MG/1
500 TABLET ORAL EVERY 12 HOURS
Status: DISCONTINUED | OUTPATIENT
Start: 2018-01-01 | End: 2018-01-01

## 2018-01-01 RX ORDER — VANCOMYCIN/0.9 % SOD CHLORIDE 1 G/100 ML
1000 PLASTIC BAG, INJECTION (ML) INTRAVENOUS ONCE
Status: DISCONTINUED | OUTPATIENT
Start: 2018-01-01 | End: 2018-01-01

## 2018-01-01 RX ORDER — IBUPROFEN 200 MG
24 TABLET ORAL
Status: DISCONTINUED | OUTPATIENT
Start: 2018-01-01 | End: 2018-01-01

## 2018-01-01 RX ORDER — ALBUTEROL SULFATE 90 UG/1
2 AEROSOL, METERED RESPIRATORY (INHALATION) EVERY 6 HOURS PRN
Qty: 18 G | Refills: 99 | Status: ON HOLD | OUTPATIENT
Start: 2018-01-01 | End: 2018-01-01 | Stop reason: HOSPADM

## 2018-01-01 RX ORDER — MORPHINE SULFATE 15 MG/1
15 TABLET, FILM COATED, EXTENDED RELEASE ORAL 2 TIMES DAILY
Qty: 60 TABLET | Refills: 0 | Status: ON HOLD | OUTPATIENT
Start: 2018-01-01 | End: 2018-01-01 | Stop reason: HOSPADM

## 2018-01-01 RX ORDER — OXYCODONE AND ACETAMINOPHEN 10; 325 MG/1; MG/1
1 TABLET ORAL EVERY 4 HOURS PRN
Qty: 120 TABLET | Refills: 0 | Status: SHIPPED | OUTPATIENT
Start: 2018-01-01 | End: 2018-01-01 | Stop reason: SDUPTHER

## 2018-01-01 RX ORDER — OXYCODONE AND ACETAMINOPHEN 5; 325 MG/1; MG/1
1 TABLET ORAL EVERY 4 HOURS PRN
Qty: 90 TABLET | Refills: 0 | Status: SHIPPED | OUTPATIENT
Start: 2018-01-01 | End: 2018-01-01

## 2018-01-01 RX ORDER — PRAVASTATIN SODIUM 40 MG/1
TABLET ORAL
COMMUNITY
Start: 2017-01-01 | End: 2018-01-01

## 2018-01-01 RX ORDER — LACTULOSE 10 G/15ML
30 SOLUTION ORAL 3 TIMES DAILY
Qty: 4050 ML | Refills: 0 | Status: SHIPPED | OUTPATIENT
Start: 2018-01-01 | End: 2018-01-01

## 2018-01-01 RX ORDER — LIDOCAINE HCL/PF 100 MG/5ML
SYRINGE (ML) INTRAVENOUS
Status: DISCONTINUED | OUTPATIENT
Start: 2018-01-01 | End: 2018-01-01

## 2018-01-01 RX ORDER — PROPOFOL 10 MG/ML
VIAL (ML) INTRAVENOUS CONTINUOUS PRN
Status: DISCONTINUED | OUTPATIENT
Start: 2018-01-01 | End: 2018-01-01

## 2018-01-01 RX ORDER — OXYCODONE AND ACETAMINOPHEN 5; 325 MG/1; MG/1
1 TABLET ORAL EVERY 6 HOURS PRN
Status: DISCONTINUED | OUTPATIENT
Start: 2018-01-01 | End: 2018-01-01

## 2018-01-01 RX ORDER — SODIUM CHLORIDE 9 MG/ML
INJECTION, SOLUTION INTRAVENOUS CONTINUOUS
Status: DISCONTINUED | OUTPATIENT
Start: 2018-01-01 | End: 2018-01-01 | Stop reason: HOSPADM

## 2018-01-01 RX ORDER — LISINOPRIL 5 MG/1
5 TABLET ORAL DAILY
Status: ON HOLD | COMMUNITY
End: 2018-01-01 | Stop reason: HOSPADM

## 2018-01-01 RX ORDER — TRAZODONE HYDROCHLORIDE 50 MG/1
50 TABLET ORAL NIGHTLY
Qty: 30 TABLET | Refills: 3 | Status: ON HOLD | OUTPATIENT
Start: 2018-01-01 | End: 2018-01-01 | Stop reason: HOSPADM

## 2018-01-01 RX ORDER — SODIUM,POTASSIUM PHOSPHATES 280-250MG
2 POWDER IN PACKET (EA) ORAL EVERY 4 HOURS
Status: ACTIVE | OUTPATIENT
Start: 2018-01-01 | End: 2018-01-01

## 2018-01-01 RX ORDER — MORPHINE SULFATE 30 MG/1
30 TABLET, FILM COATED, EXTENDED RELEASE ORAL 2 TIMES DAILY
Qty: 60 TABLET | Refills: 0 | Status: SHIPPED | OUTPATIENT
Start: 2018-01-01 | End: 2018-01-01

## 2018-01-01 RX ORDER — PANTOPRAZOLE SODIUM 40 MG/1
40 TABLET, DELAYED RELEASE ORAL DAILY
Qty: 30 TABLET | Refills: 1 | Status: ON HOLD | OUTPATIENT
Start: 2018-01-01 | End: 2018-01-01 | Stop reason: HOSPADM

## 2018-01-01 RX ORDER — MORPHINE SULFATE 15 MG/1
15 TABLET, FILM COATED, EXTENDED RELEASE ORAL 2 TIMES DAILY
Status: DISCONTINUED | OUTPATIENT
Start: 2018-01-01 | End: 2018-01-01

## 2018-01-01 RX ORDER — FENTANYL CITRATE 50 UG/ML
INJECTION, SOLUTION INTRAMUSCULAR; INTRAVENOUS CODE/TRAUMA/SEDATION MEDICATION
Status: COMPLETED | OUTPATIENT
Start: 2018-01-01 | End: 2018-01-01

## 2018-01-01 RX ORDER — SODIUM CHLORIDE 9 MG/ML
INJECTION, SOLUTION INTRAVENOUS CONTINUOUS PRN
Status: DISCONTINUED | OUTPATIENT
Start: 2018-01-01 | End: 2018-01-01

## 2018-01-01 RX ORDER — IBUPROFEN 200 MG
16 TABLET ORAL
Status: DISCONTINUED | OUTPATIENT
Start: 2018-01-01 | End: 2018-01-01

## 2018-01-01 RX ORDER — MIDAZOLAM HYDROCHLORIDE 1 MG/ML
INJECTION, SOLUTION INTRAMUSCULAR; INTRAVENOUS
Status: DISCONTINUED | OUTPATIENT
Start: 2018-01-01 | End: 2018-01-01

## 2018-01-01 RX ORDER — HEPARIN 100 UNIT/ML
500 SYRINGE INTRAVENOUS
Status: DISCONTINUED | OUTPATIENT
Start: 2018-01-01 | End: 2018-01-01 | Stop reason: HOSPADM

## 2018-01-01 RX ORDER — LORAZEPAM 2 MG/ML
0.5 INJECTION INTRAMUSCULAR EVERY 6 HOURS PRN
Status: DISCONTINUED | OUTPATIENT
Start: 2018-01-01 | End: 2018-01-01 | Stop reason: HOSPADM

## 2018-01-01 RX ORDER — LORAZEPAM 0.5 MG/1
0.5 TABLET ORAL EVERY 6 HOURS PRN
Status: DISCONTINUED | OUTPATIENT
Start: 2018-01-01 | End: 2018-01-01

## 2018-01-01 RX ORDER — LIDOCAINE AND PRILOCAINE 25; 25 MG/G; MG/G
CREAM TOPICAL
Qty: 5 G | Refills: 0 | Status: ON HOLD | OUTPATIENT
Start: 2018-01-01 | End: 2018-01-01 | Stop reason: HOSPADM

## 2018-01-01 RX ORDER — ONDANSETRON 8 MG/1
8 TABLET, ORALLY DISINTEGRATING ORAL EVERY 8 HOURS PRN
Qty: 30 TABLET | Refills: 1 | Status: ON HOLD | OUTPATIENT
Start: 2018-01-01 | End: 2018-01-01 | Stop reason: HOSPADM

## 2018-01-01 RX ORDER — GLUCAGON 1 MG
1 KIT INJECTION
Status: DISCONTINUED | OUTPATIENT
Start: 2018-01-01 | End: 2018-01-01

## 2018-01-01 RX ORDER — DEXTROSE MONOHYDRATE AND SODIUM CHLORIDE 5; .9 G/100ML; G/100ML
1000 INJECTION, SOLUTION INTRAVENOUS
Status: DISCONTINUED | OUTPATIENT
Start: 2018-01-01 | End: 2018-01-01

## 2018-01-01 RX ORDER — ALPRAZOLAM 0.5 MG/1
0.5 TABLET ORAL 2 TIMES DAILY PRN
Status: DISCONTINUED | OUTPATIENT
Start: 2018-01-01 | End: 2018-01-01

## 2018-01-01 RX ORDER — FENTANYL CITRATE 50 UG/ML
50 INJECTION, SOLUTION INTRAMUSCULAR; INTRAVENOUS
Status: DISCONTINUED | OUTPATIENT
Start: 2018-01-01 | End: 2018-01-01 | Stop reason: HOSPADM

## 2018-01-01 RX ORDER — CEFAZOLIN SODIUM 1 G/3ML
1 INJECTION, POWDER, FOR SOLUTION INTRAMUSCULAR; INTRAVENOUS
Status: COMPLETED | OUTPATIENT
Start: 2018-01-01 | End: 2018-01-01

## 2018-01-01 RX ORDER — SODIUM,POTASSIUM PHOSPHATES 280-250MG
2 POWDER IN PACKET (EA) ORAL EVERY 4 HOURS
Status: DISPENSED | OUTPATIENT
Start: 2018-01-01 | End: 2018-01-01

## 2018-01-01 RX ORDER — NAPROXEN 250 MG/1
250 TABLET ORAL 2 TIMES DAILY
Status: ON HOLD | COMMUNITY
End: 2018-01-01 | Stop reason: HOSPADM

## 2018-01-01 RX ORDER — CEFEPIME HYDROCHLORIDE 2 G/1
2 INJECTION, POWDER, FOR SOLUTION INTRAVENOUS
Status: DISCONTINUED | OUTPATIENT
Start: 2018-01-01 | End: 2018-01-01

## 2018-01-01 RX ORDER — PANTOPRAZOLE SODIUM 40 MG/1
40 TABLET, DELAYED RELEASE ORAL DAILY
Status: DISCONTINUED | OUTPATIENT
Start: 2018-01-01 | End: 2018-01-01

## 2018-01-01 RX ORDER — IPRATROPIUM BROMIDE AND ALBUTEROL SULFATE 2.5; .5 MG/3ML; MG/3ML
3 SOLUTION RESPIRATORY (INHALATION) EVERY 4 HOURS PRN
Status: DISCONTINUED | OUTPATIENT
Start: 2018-01-01 | End: 2018-01-01

## 2018-01-01 RX ORDER — LIDOCAINE HYDROCHLORIDE 40 MG/ML
SOLUTION TOPICAL
Status: DISCONTINUED | OUTPATIENT
Start: 2018-01-01 | End: 2018-01-01

## 2018-01-01 RX ORDER — MIDAZOLAM HYDROCHLORIDE 1 MG/ML
INJECTION INTRAMUSCULAR; INTRAVENOUS CODE/TRAUMA/SEDATION MEDICATION
Status: COMPLETED | OUTPATIENT
Start: 2018-01-01 | End: 2018-01-01

## 2018-01-01 RX ORDER — SODIUM CHLORIDE 0.9 % (FLUSH) 0.9 %
10 SYRINGE (ML) INJECTION
Status: DISCONTINUED | OUTPATIENT
Start: 2018-01-01 | End: 2018-01-01 | Stop reason: HOSPADM

## 2018-01-01 RX ORDER — HEPARIN SODIUM 200 [USP'U]/100ML
500 INJECTION, SOLUTION INTRAVENOUS CONTINUOUS
Status: DISCONTINUED | OUTPATIENT
Start: 2018-01-01 | End: 2018-01-01 | Stop reason: HOSPADM

## 2018-01-01 RX ORDER — HEPARIN 100 UNIT/ML
500 SYRINGE INTRAVENOUS
Status: CANCELLED | OUTPATIENT
Start: 2018-01-01

## 2018-01-01 RX ORDER — PROPOFOL 10 MG/ML
VIAL (ML) INTRAVENOUS
Status: DISCONTINUED | OUTPATIENT
Start: 2018-01-01 | End: 2018-01-01

## 2018-01-01 RX ORDER — SODIUM CHLORIDE 0.9 % (FLUSH) 0.9 %
3 SYRINGE (ML) INJECTION
Status: DISCONTINUED | OUTPATIENT
Start: 2018-01-01 | End: 2018-01-01 | Stop reason: HOSPADM

## 2018-01-01 RX ADMIN — PANTOPRAZOLE SODIUM 40 MG: 40 TABLET, DELAYED RELEASE ORAL at 11:04

## 2018-01-01 RX ADMIN — CEFEPIME HYDROCHLORIDE 2 G: 2 INJECTION, POWDER, FOR SOLUTION INTRAVENOUS at 02:04

## 2018-01-01 RX ADMIN — DEXTROSE AND SODIUM CHLORIDE: 5; .45 INJECTION, SOLUTION INTRAVENOUS at 11:04

## 2018-01-01 RX ADMIN — LIDOCAINE HYDROCHLORIDE 20 MG: 20 INJECTION, SOLUTION INTRAVENOUS at 07:03

## 2018-01-01 RX ADMIN — DEXTROSE MONOHYDRATE 25 G: 25 INJECTION, SOLUTION INTRAVENOUS at 11:04

## 2018-01-01 RX ADMIN — MORPHINE SULFATE 15 MG: 15 TABLET, FILM COATED, EXTENDED RELEASE ORAL at 08:04

## 2018-01-01 RX ADMIN — MORPHINE SULFATE 15 MG: 15 TABLET, FILM COATED, EXTENDED RELEASE ORAL at 09:04

## 2018-01-01 RX ADMIN — FENTANYL CITRATE 25 MCG: 50 INJECTION, SOLUTION INTRAMUSCULAR; INTRAVENOUS at 11:03

## 2018-01-01 RX ADMIN — COLLAGENASE SANTYL: 250 OINTMENT TOPICAL at 02:04

## 2018-01-01 RX ADMIN — SODIUM CHLORIDE: 0.9 INJECTION, SOLUTION INTRAVENOUS at 09:03

## 2018-01-01 RX ADMIN — MIDAZOLAM HYDROCHLORIDE 0.5 MG: 1 INJECTION, SOLUTION INTRAMUSCULAR; INTRAVENOUS at 12:03

## 2018-01-01 RX ADMIN — CEFAZOLIN 1 G: 330 INJECTION, POWDER, FOR SOLUTION INTRAMUSCULAR; INTRAVENOUS at 10:03

## 2018-01-01 RX ADMIN — DEXTROSE AND SODIUM CHLORIDE: 5; .45 INJECTION, SOLUTION INTRAVENOUS at 02:04

## 2018-01-01 RX ADMIN — GADOBUTROL 4 ML: 604.72 INJECTION INTRAVENOUS at 04:04

## 2018-01-01 RX ADMIN — CEFEPIME HYDROCHLORIDE 2 G: 2 INJECTION, POWDER, FOR SOLUTION INTRAVENOUS at 04:04

## 2018-01-01 RX ADMIN — SODIUM CHLORIDE: 9 INJECTION, SOLUTION INTRAVENOUS at 12:03

## 2018-01-01 RX ADMIN — PIPERACILLIN AND TAZOBACTAM 4.5 G: 4; .5 INJECTION, POWDER, LYOPHILIZED, FOR SOLUTION INTRAVENOUS; PARENTERAL at 12:04

## 2018-01-01 RX ADMIN — LIDOCAINE HYDROCHLORIDE 160 MG: 40 SOLUTION TOPICAL at 07:03

## 2018-01-01 RX ADMIN — COLLAGENASE SANTYL: 250 OINTMENT TOPICAL at 09:04

## 2018-01-01 RX ADMIN — PROPOFOL 130 MG: 10 INJECTION, EMULSION INTRAVENOUS at 07:03

## 2018-01-01 RX ADMIN — FENTANYL CITRATE 25 MCG: 50 INJECTION, SOLUTION INTRAMUSCULAR; INTRAVENOUS at 07:03

## 2018-01-01 RX ADMIN — Medication 1 MG: at 02:04

## 2018-01-01 RX ADMIN — PROPOFOL 150 MCG/KG/MIN: 10 INJECTION, EMULSION INTRAVENOUS at 07:03

## 2018-01-01 RX ADMIN — POTASSIUM & SODIUM PHOSPHATES POWDER PACK 280-160-250 MG 2 PACKET: 280-160-250 PACK at 10:04

## 2018-01-01 RX ADMIN — HYDROCORTISONE SODIUM SUCCINATE 100 MG: 100 INJECTION, POWDER, FOR SOLUTION INTRAMUSCULAR; INTRAVENOUS at 02:04

## 2018-01-01 RX ADMIN — SODIUM CHLORIDE 500 ML: 9 INJECTION, SOLUTION INTRAVENOUS at 10:03

## 2018-01-01 RX ADMIN — DEXTROSE MONOHYDRATE 12.5 G: 25 INJECTION, SOLUTION INTRAVENOUS at 12:04

## 2018-01-01 RX ADMIN — ENOXAPARIN SODIUM 40 MG: 100 INJECTION SUBCUTANEOUS at 05:04

## 2018-01-01 RX ADMIN — Medication 16 G: at 09:04

## 2018-01-01 RX ADMIN — POTASSIUM & SODIUM PHOSPHATES POWDER PACK 280-160-250 MG 2 PACKET: 280-160-250 PACK at 08:04

## 2018-01-01 RX ADMIN — SODIUM CHLORIDE, PRESERVATIVE FREE 10 ML: 5 INJECTION INTRAVENOUS at 11:03

## 2018-01-01 RX ADMIN — LORAZEPAM 0.5 MG: 2 INJECTION, SOLUTION INTRAMUSCULAR; INTRAVENOUS at 12:04

## 2018-01-01 RX ADMIN — PANTOPRAZOLE SODIUM 40 MG: 40 TABLET, DELAYED RELEASE ORAL at 08:04

## 2018-01-01 RX ADMIN — DEXTROSE AND SODIUM CHLORIDE: 5; .45 INJECTION, SOLUTION INTRAVENOUS at 10:04

## 2018-01-01 RX ADMIN — PREDNISONE 10 MG: 5 SOLUTION ORAL at 12:04

## 2018-01-01 RX ADMIN — FENTANYL CITRATE 25 MCG: 50 INJECTION, SOLUTION INTRAMUSCULAR; INTRAVENOUS at 12:03

## 2018-01-01 RX ADMIN — DEXTROSE MONOHYDRATE 12.5 G: 25 INJECTION, SOLUTION INTRAVENOUS at 05:04

## 2018-01-01 RX ADMIN — FOLIC ACID: 5 INJECTION, SOLUTION INTRAMUSCULAR; INTRAVENOUS; SUBCUTANEOUS at 12:04

## 2018-01-01 RX ADMIN — MORPHINE SULFATE 15 MG: 15 TABLET, FILM COATED, EXTENDED RELEASE ORAL at 10:04

## 2018-01-01 RX ADMIN — PROPOFOL 70 MG: 10 INJECTION, EMULSION INTRAVENOUS at 07:03

## 2018-01-01 RX ADMIN — LIDOCAINE HYDROCHLORIDE 16 ML: 10 INJECTION, SOLUTION EPIDURAL; INFILTRATION; INTRACAUDAL; PERINEURAL at 07:03

## 2018-01-01 RX ADMIN — MIDAZOLAM HYDROCHLORIDE 1 MG: 1 INJECTION, SOLUTION INTRAMUSCULAR; INTRAVENOUS at 11:03

## 2018-01-01 RX ADMIN — DEXTROSE MONOHYDRATE 12.5 G: 25 INJECTION, SOLUTION INTRAVENOUS at 06:04

## 2018-01-01 RX ADMIN — MIDAZOLAM HYDROCHLORIDE 1 MG: 1 INJECTION, SOLUTION INTRAMUSCULAR; INTRAVENOUS at 07:03

## 2018-01-01 RX ADMIN — DEXTROSE AND SODIUM CHLORIDE: 5; .45 INJECTION, SOLUTION INTRAVENOUS at 06:04

## 2018-01-01 RX ADMIN — LORAZEPAM 0.5 MG: 2 INJECTION, SOLUTION INTRAMUSCULAR; INTRAVENOUS at 05:04

## 2018-01-01 RX ADMIN — SODIUM CHLORIDE 1197 ML: 0.9 INJECTION, SOLUTION INTRAVENOUS at 11:04

## 2018-01-01 RX ADMIN — SODIUM CHLORIDE: 0.9 INJECTION, SOLUTION INTRAVENOUS at 06:03

## 2018-01-01 RX ADMIN — CEFEPIME HYDROCHLORIDE 2 G: 2 INJECTION, POWDER, FOR SOLUTION INTRAVENOUS at 03:04

## 2018-01-01 RX ADMIN — DEXTROSE AND SODIUM CHLORIDE: 5; .45 INJECTION, SOLUTION INTRAVENOUS at 04:04

## 2018-01-01 RX ADMIN — DEXTROSE AND SODIUM CHLORIDE: 5; .45 INJECTION, SOLUTION INTRAVENOUS at 08:04

## 2018-01-01 RX ADMIN — HEPARIN 500 UNITS: 100 SYRINGE at 11:03

## 2018-01-01 RX ADMIN — SODIUM CHLORIDE: 9 INJECTION, SOLUTION INTRAVENOUS at 11:03

## 2018-01-01 RX ADMIN — DEXTROSE AND SODIUM CHLORIDE: 5; .9 INJECTION, SOLUTION INTRAVENOUS at 04:04

## 2018-01-01 RX ADMIN — ALPRAZOLAM 0.5 MG: 0.5 TABLET ORAL at 08:04

## 2018-01-01 RX ADMIN — GADOBUTROL 4 ML: 604.72 INJECTION INTRAVENOUS at 09:04

## 2018-01-01 RX ADMIN — SODIUM CHLORIDE 200 MG: 9 INJECTION, SOLUTION INTRAVENOUS at 10:03

## 2018-02-02 NOTE — PATIENT INSTRUCTIONS
Gastritis (Adult)    Gastritis is inflammation and irritation of the stomach lining. It can be present for a short time (acute) or be long lasting (chronic). Gastritis is often caused by infection with bacteria called H pylori. More than a third of people in the [place] have this bacteria in their bodies. In many cases, H pylori causes no problems or symptoms. In some people, though, the infection irritates the stomach lining and causes gastritis. Other causes of stomach irritation include drinking alcohol or taking pain-relieving medicines called NSAIDs (such as aspirin or ibuprofen).   Symptoms of gastritis can include:  · Abdominal pain or bloating  · Loss of appetite  · Nausea or vomiting  · Vomiting blood or having black stools  · Feeling more tired than usual  An inflamed and irritated stomach lining is more likely to develop a sore called an ulcer. To help prevent this, gastritis should be treated.  Home care  If needed, medicines may be prescribed. If you have H pylori infection, treating it will likely relieve your symptoms. Other changes can help reduce stomach irritation and help it heal.  · If you have been prescribed medicines for H pylori infection, take them as directed. Take all of the medicine until it is finished or your healthcare provider tells you to stop, even if you feel better.  · Your healthcare provider may recommend avoiding NSAIDs. If you take daily aspirin for your heart or other medical reasons, do not stop without talking to your healthcare provider first.  · Avoid drinking alcohol.  · Stop smoking. Smoking can irritate the stomach and delay healing. As much as possible, stay away from second hand smoke.  Follow-up care  Follow up with your healthcare provider, or as advised by our staff. Testing may be needed to check for inflammation or an ulcer.  When to seek medical advice  Call your healthcare provider for any of the following:  · Stomach pain that gets worse or moves to the lower  right abdomen (appendix area)  · Chest pain that appears or gets worse, or spreads to the back, neck, shoulder, or arm  · Frequent vomiting (cant keep down liquids)  · Blood in the stool or vomit (red or black in color)  · Feeling weak or dizzy  · Fever of 100.4ºF (38ºC) or higher, or as directed by your healthcare provider  Date Last Reviewed: 6/22/2015  © 9948-8471 "[employer]". [address] [place] [address]. All rights reserved. This information is not intended as a substitute for professional medical care. Always follow your healthcare professional's instructions.

## 2018-02-02 NOTE — PROGRESS NOTES
Subjective:       Patient ID: Teddy Caballero is a 52 y.o. male.    Chief Complaint: Abdominal Pain (started month an a half)    Patient is here today with upper abdominal pain for the past 6 weeks.  Patient reports the epigastric pain is intermittent and occurs around 30 minutes after eating and lasts for several hours.  Reports he has tried nothing for the pain - see notes below.      Abdominal Pain   This is a chronic problem. The current episode started more than 1 month ago (started 6 weeks ago). The onset quality is gradual. The problem occurs intermittently. The pain is located in the LUQ, RUQ and epigastric region. The pain is at a severity of 6/10. The quality of the pain is sharp and cramping. Pertinent negatives include no arthralgias, constipation, diarrhea, dysuria, fever, headaches, hematuria, myalgias, nausea or vomiting. The pain is aggravated by eating. Treatments tried: Ibuprofen  - no help.       Previous Medications    ASPIRIN (ECOTRIN) 81 MG EC TABLET    Take 81 mg by mouth once daily.    ATORVASTATIN (LIPITOR) 40 MG TABLET    Take 40 mg by mouth once daily.    FISH OIL-OMEGA-3 FATTY ACIDS 300-1,000 MG CAPSULE    Take by mouth once daily.    LISINOPRIL (PRINIVIL,ZESTRIL) 5 MG TABLET    Take 5 mg by mouth once daily.    MULTIVITAMIN CAPSULE    Take 1 capsule by mouth once daily.       Past Medical History:   Diagnosis Date    Coronary artery disease 11/2014    stent x 1 in Nov. 2014; followed by Dr. Wei    Hyperlipidemia     Hypertension     NSTEMI (non-ST elevated myocardial infarction) 11/07/2014       Past Surgical History:   Procedure Laterality Date    CORONARY ANGIOPLASTY WITH STENT PLACEMENT  11/2014    dr. Wei    stent placed  11/07/2014    on placed.       Family History   Problem Relation Age of Onset    Heart disease Mother      CABG x 3 vessels - 55    Diabetes Mother     Stroke Mother     Heart disease Father      CABG x 4 - age 40    Cancer Brother 29      colon,liver     Cancer Brother      gastric passed age 43       Social History     Social History    Marital status:      Spouse name: N/A    Number of children: N/A    Years of education: N/A     Occupational History    security at [employer]      Social History Main Topics    Smoking status: Current Every Day Smoker     Packs/day: 0.25     Years: 30.00     Types: Cigarettes    Smokeless tobacco: Never Used    Alcohol use Yes      Comment: every weekend - 4 beers    Drug use: No    Sexual activity: Not Asked     Other Topics Concern    None     Social History Narrative    None       Review of Systems   Constitutional: Positive for appetite change. Negative for activity change, fatigue, fever and unexpected weight change.   HENT: Negative for congestion, ear pain, mouth sores, nosebleeds, postnasal drip, rhinorrhea, sinus pressure, sneezing, sore throat, trouble swallowing and voice change.    Eyes: Negative.    Respiratory: Negative for cough, chest tightness and shortness of breath.    Cardiovascular: Negative for chest pain, palpitations and leg swelling.   Gastrointestinal: Positive for abdominal pain. Negative for blood in stool, constipation, diarrhea, nausea and vomiting.        Decreased appetite.  Pain worsens with food intake.   Endocrine: Negative.    Genitourinary: Negative for difficulty urinating, dysuria, flank pain, hematuria and urgency.   Musculoskeletal: Negative for arthralgias, back pain, gait problem, joint swelling, myalgias and neck pain.   Skin: Negative for color change, rash and wound.   Allergic/Immunologic: Negative for immunocompromised state.   Neurological: Negative for dizziness, tremors, seizures, syncope, speech difficulty and headaches.   Hematological: Negative for adenopathy. Does not bruise/bleed easily.   Psychiatric/Behavioral: Negative for behavioral problems, dysphoric mood, sleep disturbance and suicidal ideas. The patient is not  "nervous/anxious.          Objective:     Vitals:    02/02/18 1352   BP: 114/70   BP Location: Left arm   Patient Position: Sitting   BP Method: Medium (Manual)   Pulse: 102   Temp: 98.6 °F (37 °C)   TempSrc: Oral   SpO2: 100%   Weight: 56 kg (123 lb 7.3 oz)   Height: 5' 9" (1.753 m)          Physical Exam   Constitutional: He is oriented to person, place, and time. He appears well-developed and well-nourished.   HENT:   Head: Normocephalic.   Right Ear: External ear normal.   Left Ear: External ear normal.   Nose: Nose normal.   Mouth/Throat: Oropharynx is clear and moist. No oropharyngeal exudate.   Eyes: EOM are normal. Pupils are equal, round, and reactive to light. Right eye exhibits no discharge. Left eye exhibits no discharge. No scleral icterus.   Neck: Normal range of motion. Neck supple. No tracheal deviation present. No thyromegaly present.   Cardiovascular: Normal rate, regular rhythm and normal heart sounds.    No murmur heard.  Pulmonary/Chest: Effort normal and breath sounds normal. No respiratory distress.   Abdominal: Soft. Bowel sounds are normal. He exhibits no distension. There is tenderness in the epigastric area. There is no rigidity, no rebound and no guarding.       Musculoskeletal: Normal range of motion. He exhibits no edema.   Lymphadenopathy:     He has no cervical adenopathy.   Neurological: He is alert and oriented to person, place, and time. Coordination normal.   Skin: Skin is warm and dry. No rash noted.   Psychiatric: He has a normal mood and affect. His behavior is normal.         Assessment:         ICD-10-CM ICD-9-CM   1. Epigastric pain R10.13 789.06       Plan:       Epigastric pain  -  Suspect gastritis.  Will treat with Protonix 40 mg daily.  See handout for other home care instructions.  Advised patient that if pain does not improve in couple weeks, return to office.  If pain resolved on medication, continue med for 4 to 8 weeks and if pain recurs will refer to gi.  -     " pantoprazole (PROTONIX) 40 MG tablet; Take 1 tablet (40 mg total) by mouth once daily.  Dispense: 30 tablet; Refill: 1      Follow-up in about 6 weeks (around 3/16/2018).     Patient's Medications   New Prescriptions    PANTOPRAZOLE (PROTONIX) 40 MG TABLET    Take 1 tablet (40 mg total) by mouth once daily.   Previous Medications    ASPIRIN (ECOTRIN) 81 MG EC TABLET    Take 81 mg by mouth once daily.    ATORVASTATIN (LIPITOR) 40 MG TABLET    Take 40 mg by mouth once daily.    FISH OIL-OMEGA-3 FATTY ACIDS 300-1,000 MG CAPSULE    Take by mouth once daily.    LISINOPRIL (PRINIVIL,ZESTRIL) 5 MG TABLET    Take 5 mg by mouth once daily.    MULTIVITAMIN CAPSULE    Take 1 capsule by mouth once daily.   Modified Medications    No medications on file   Discontinued Medications    No medications on file

## 2018-02-16 NOTE — PATIENT INSTRUCTIONS
NSAIDS are Non Steroidal Anti Inflammatory Drugs taken for pain.   You should stay away from these medications (unless they are advised due to cardiac health issues) as they may cause irritation and/or ulceration in the lining of your stomach.  Ask your primary care physician for an alternative medication.    Powders - BC Powder, Goody powder, Stanback    Ibuprofen which is also known as : Advil, Advil Childrens, Advil Elkin Strength, Advil Liquigel, Advil Migraine, Advil Pediatric, Childrens Ibuprofen Berry, Genpril, IBU, Midol IB, Midol Maximum Strength Cramp Formula, Dolgesic, Motrin Childrens, Motrin IB, Motrin Infant Drops, Motrin Elkin Strength, Motrin Migraine Pain, Nuprin, Migraine Liqui-gels, Ibu-Tab 200, Cap-Profen, Tab-Profen, Profen, Ibuprohm, Childrens Elixsure, IB Pro, Vicoprofen, Combunox, A-G Profen, Actiprofen, Addaprin, Advil Infants Concentrated Drops, Caldolor, Haltran, Q-Profen, Ibifon 600, Ibren, Menadol, Midol Cramps & Bodyaches, Rufen, Saleto-200, Surinder, Ultraprin, Uni-Pro, Wal-Profen., Famotidine and Ibuprofen can be found as : Duexis.    Aspirin which has the brand name : Arthritis Pain, Aspergum, Aspir-Low, Aspirin Lite Coat, Reji Aspirin, Bufferin, Easprin, Ecotrin, Empirin, Fasprin, Genacote, Halfprin, Harbert Aspirin, Egegik Aspirin, Stanback Analgesic, Tri-Buffered Aspirin, YSP Aspirin, Zorprin.    Ketoprofen which is  known as : Orudis KT, Oruvail, Actron.    Sulindac which is sold as : Clinoril.    Naproxen is one of the most known drug from NSAIDs list, and is sold as : Aleve, Naprosyn, EC-Naprosyn, Naprelan, Anaprox, All Day Pain Relief, Aflaxen, All Day Relief, Anaprox-DS, Comfort Pac  With Naproxen,  Aleve Caplet, Aleve Easy Open Arthritis, Aleve Gelcap,  Anaprox-DS, EC-Naprosyn, Leader Naproxen Sodium, Midol Extended Relief, Naprelan 375?, Naprelan 500?, Naprelan 750?, Prevacid NapraPac 375,  Prevacid NapraPac, Naprapac, Vimovo.    Etodolac is sold under the brand  name : Lodine XL, Lodine.  Fenoprofen which can be found on the market as : Nalfon, Nalfon 200.  Diclofenac which is sold as : Arthrotec, Cataflam, Voltaren, Cambia, Voltaren-XR, Zipsor.  Flurbiprofen sold as : Asaid.  Ketorolac is sold under the brand name : Sprix, Toradol, Toradol IM, Toradol IV/IM.  Piroxicam is found on the market as : Feldene.  Indomethacin which is known as : Indocin SR, Indocin, Indo-Sierra, Indomethegan, Indocin IV.  Mefenamic Acid sold as : Ponstel.  Meloxicam is sold under the brand name : Mobic.  Nabumetone which is sold as : Relafen.  Oxaprozin which has the brand name : Daypro.  Ketoprofen which has the brand name : Actron, Orudis KT, Orudis, Oruvail.  Meclofenamate sold as : Meclomen.  Tolmetin which can be found on the marked as : Tolectin, Tolectin 600, Tolectin DS.  Salsalate which is sold as : Disalcid.

## 2018-02-16 NOTE — PROGRESS NOTES
[facility] Note    Reason for Visit:  The primary encounter diagnosis was Upper abdominal pain. Diagnoses of Change in bowel habit, Screen for colon cancer, Family history of colon cancer, Family history of gastric cancer, and Weight loss were also pertinent to this visit.    PCP:   Iliana Campa       Referring MD:  Aaareferral Self  No address on file    HPI:  This is a 52 y.o. male here for evaluation of abdominal pain.    Abdominal pain  ONSET:since mid Dec 2017 after being switched from  Atorvastatin to pravastatin, so switched back to atorvastatin, but pain persists.  LOCATION:across upper abd R>L  DURATION:intermittent. Daily. Progressive worsening.  CHARACTER:sharp, cramp  ASSOCIATED/ALLEVIATING/AGGRAVAITING:no n/v/fevers. Worse after meals. Worse if lying flat. Not worse with movement. Decreased appetite d/t pain. Sometimes better with BM. Not better with pantoprazole 40 mg once daily since 2/6/2018 per family meds. Some improvement with Bentyl 10 mg four times daily. Some improvement with naproxen.   RADIATION:to upper back  TEMPORAL:worse 30 min after meals. lasts for hours. Awakes from sleep.  SEVERITY: usually 5/10. Increases to 8/10 at night.     Reflux - no  Dysphagia/odynophagia - no   Bowel habits - intermittent constipation since abd pain began. With 1 bristol type 4 BM 1-2 days weekly. With straining. Takes exlax once weekly with some improvement.   GI bleeding - denies hematochezia, hematemesis, melena, BRBPR, black/tarry stools, and coffee ground emesis  NSAID usage - ASA 81 mg daily. Naproxen once daily since abd pain began    ROS:  Constitutional: No fevers, no chills, 13 lb  weight loss since 11/2017, no fatigue,   ENT: No allergies  CV: No chest pain, no palpitations, no perif. edema, no sob on exertion  Pulm: No cough, No shortness of breath, no wheezes, no sputum  Ophtho: No vision changes  GI: see HPI; also no nausea, no vomiting,   Derm: No rash  Heme: No  lymphadenopathy, No bruising  MSK: No arthritis, no muscle pain, no muscle weakness  : No dysuria, No hematuria  Endo: No hot or cold intolerance  Neuro: No syncope, No seizure,       Medical History:  has a past medical history of Coronary artery disease (11/2014); Hyperlipidemia; Hypertension; and NSTEMI (non-ST elevated myocardial infarction) (11/07/2014).    Surgical History:  has a past surgical history that includes stent placed (11/07/2014) and Coronary angioplasty with stent (11/2014).    Family History: family history includes Cancer in his brother; Cancer (age of onset: 29) in his brother; Colon cancer (age of onset: 29) in his brother; Diabetes in his mother; Heart disease in his father and mother; Stomach cancer (age of onset: 39) in his brother; Stroke in his mother..     Social History:  reports that he has been smoking Cigarettes.  He has a 7.50 pack-year smoking history. He has never used smokeless tobacco. He reports that he drinks alcohol. He reports that he does not use drugs.    Review of patient's allergies indicates:   Allergen Reactions    Motrin [ibuprofen] Swelling       Current Outpatient Prescriptions   Medication Sig    aspirin (ECOTRIN) 81 MG EC tablet Take 81 mg by mouth once daily.    atorvastatin (LIPITOR) 40 MG tablet Take 40 mg by mouth once daily.    dicyclomine (BENTYL) 10 MG capsule Take 1 capsule (10 mg total) by mouth 4 (four) times daily as needed (abdominal pains).    fish oil-omega-3 fatty acids 300-1,000 mg capsule Take by mouth once daily.    lisinopril (PRINIVIL,ZESTRIL) 5 MG tablet Take 5 mg by mouth once daily.    multivitamin capsule Take 1 capsule by mouth once daily.    naproxen (NAPROSYN) 250 MG tablet Take 250 mg by mouth 2 (two) times daily.    pantoprazole (PROTONIX) 40 MG tablet Take 1 tablet (40 mg total) by mouth once daily.     No current facility-administered medications for this visit.        Objective Findings:    Vital Signs:  /77    "Pulse 91   Ht 5' 9" (1.753 m)   Wt 53.8 kg (118 lb 9.7 oz)   BMI 17.52 kg/m²   Body mass index is 17.52 kg/m².    Physical Exam:  General Appearance: Well appearing in no acute distress  Head:   Normocephalic, without obvious abnormality  Eyes:    No scleral icterus, EOMI  ENT: Neck supple, Lips, mucosa, and tongue normal; teeth and gums normal  Lungs: CTA bilaterally in anterior and posterior fields, no wheezes, no crackles.  Heart:  Regular rate and rhythm, S1, S2 normal, no murmurs heard  Abdomen: Soft, mild upper abd tenderness, non distended with positive bowel sounds in all four quadrants. No hepatosplenomegaly, ascites, or mass  Extremities: 2+ radial pulses, no clubbing, cyanosis or edema  Skin: No rash to exposed areas  Neurologic: A&Ox4      Labs: CBC, lipids, CMP results from 11/2017 reviewed per [place].  No results found for: WBC, HGB, HCT, PLT, CHOL, TRIG, HDL, LDLDIRECT, ALT, AST, NA, K, CL, CREATININE, BUN, CO2, TSH, PSA, INR, GLUF, HGBA1C, MICROALBUR    Imaging:  No abd imaging to view  Endoscopy:    none  Assessment:  1. Upper abdominal pain    2. Change in bowel habit    3. Screen for colon cancer    4. Family history of colon cancer    5. Family history of gastric cancer    6. Weight loss           Recommendations:  1. Upper abd pain-EGD. abd us. CBC, CMP, Lipase, TSH. Will follow with CT if testing unrevealing. Avoid non cards nsaids as per handout provided. Continue daily PPI and Bentyl up to QID PRN.   2. Change in bowel habits-colonoscopy. Check labs.  3. Screen for colon cancer-colonoscopy.  4. Fam h/o colon cancer- colonoscopy.  5. Family h/o gastric cancer- EGD  6. Weight loss- check labs. EGD/colon. Will follow with CT if testing unrevealing.    F/u pending results       Order summary:  Orders Placed This Encounter    US Abdomen Complete    CBC auto differential    Comprehensive metabolic panel    Lipase    TSH    dicyclomine (BENTYL) 10 MG capsule    Case request GI: " ESOPHAGOGASTRODUODENOSCOPY (EGD), COLONOSCOPY         Thank you so much for allowing me to participate in the care of Teddy Holland, SYED, FNP-C

## 2018-02-21 NOTE — TELEPHONE ENCOUNTER
Please call pt to inform: abdominal us with an enlarged  lymph node (periaortic). We need a CT scan of the abd for further evaluation. Orders placed. Also, tell him to take OTC Miralax once daily for constipation.    Thanks,  Tiff, NP

## 2018-02-23 NOTE — TELEPHONE ENCOUNTER
----- Message from Stevan Whitmore MD sent at 2/23/2018  2:46 PM CST -----  Regarding: RE: CT results  Oncology appointment with .      ----- Message -----  From: Snehal Holland NP  Sent: 2/23/2018   2:36 PM  To: Stevan Whitmore MD  Subject: CT results                                       Hi Dr. Whitmore,  I saw Mr. Caballero for abdominal pain, wt loss, change in bowel habits. CT reveals adrenal, kidney, liver masses concerning for metastatic disease. He has not been seen by anyone else in GI. You will be scoping him. What referral is appropriate based on the CT results? Heme/onc, surgical etc?    Please adviseSybil NP

## 2018-02-23 NOTE — TELEPHONE ENCOUNTER
S/w pt. CT results discussed. Questions answered to his satisfaction. Ambulatory referral placed to Dr. Meadows in oncology. MA to call pt to coordinate appt. Pt to also proceed with procedures as previously advised. Understanding verbalized.

## 2018-02-26 NOTE — PROGRESS NOTES
Subjective:       Patient ID: Teddy Caballero is a 52 y.o. male.    Chief Complaint: Metastatic cancer    HPI     Pt referred from GI after evaluation for abd pain revealed what appears to be metastatic disease throughout the abdomen, including adrenal, renal mass, LNs, and questionable liver met.  No biopsy yet.  Notes 30 lb wt loss over the last few months, including 10 lbs in the last week or so.  Notes abdominal pain, constipation, nausea, etc.      PS = 1     Review of Systems   Constitutional: Positive for activity change, appetite change, fatigue and unexpected weight change. Negative for chills and fever.   HENT: Negative for congestion, hearing loss, mouth sores, sore throat and trouble swallowing.    Eyes: Negative for pain and visual disturbance.   Respiratory: Negative for cough, shortness of breath and wheezing.    Cardiovascular: Negative for chest pain, palpitations and leg swelling.   Gastrointestinal: Positive for abdominal pain, constipation and nausea. Negative for diarrhea and vomiting.   Endocrine: Negative for cold intolerance and heat intolerance.   Genitourinary: Negative for difficulty urinating, discharge, dysuria, enuresis, frequency, hematuria, scrotal swelling and testicular pain.   Musculoskeletal: Negative for arthralgias and myalgias.   Skin: Negative for color change, rash and wound.   Allergic/Immunologic: Negative for environmental allergies and food allergies.   Neurological: Negative for weakness, numbness and headaches.   Hematological: Negative for adenopathy. Does not bruise/bleed easily.   Psychiatric/Behavioral: Negative for confusion, hallucinations and sleep disturbance. The patient is not nervous/anxious.    All other systems reviewed and are negative.      Allergies:  Review of patient's allergies indicates:   Allergen Reactions    Motrin [ibuprofen] Swelling       Medications:  Current Outpatient Prescriptions   Medication Sig Dispense Refill    aspirin (ECOTRIN) 81  MG EC tablet Take 81 mg by mouth once daily.      atorvastatin (LIPITOR) 40 MG tablet Take 40 mg by mouth once daily.      dicyclomine (BENTYL) 10 MG capsule Take 1 capsule (10 mg total) by mouth 4 (four) times daily as needed (abdominal pains). 120 capsule 0    fish oil-omega-3 fatty acids 300-1,000 mg capsule Take by mouth once daily.      lisinopril (PRINIVIL,ZESTRIL) 5 MG tablet Take 5 mg by mouth once daily.      multivitamin capsule Take 1 capsule by mouth once daily.      naproxen (NAPROSYN) 250 MG tablet Take 250 mg by mouth 2 (two) times daily.      ondansetron (ZOFRAN-ODT) 8 MG TbDL Take 1 tablet (8 mg total) by mouth every 8 (eight) hours as needed (nausea or vomiting - CANCER PATIENT ON CHEMO). 30 tablet 1    oxyCODONE-acetaminophen (PERCOCET) 5-325 mg per tablet Take 1 tablet by mouth every 4 (four) hours as needed for Pain. 90 tablet 0    pantoprazole (PROTONIX) 40 MG tablet Take 1 tablet (40 mg total) by mouth once daily. 30 tablet 1     No current facility-administered medications for this visit.        PMH:  Past Medical History:   Diagnosis Date    Coronary artery disease 11/2014    stent x 1 in Nov. 2014; followed by Dr. Wei    Hyperlipidemia     Hypertension     NSTEMI (non-ST elevated myocardial infarction) 11/07/2014       PSH:  Past Surgical History:   Procedure Laterality Date    CORONARY ANGIOPLASTY WITH STENT PLACEMENT  11/2014    dr. Wei    stent placed  11/07/2014    on placed.       FamHx:  Family History   Problem Relation Age of Onset    Heart disease Mother      CABG x 3 vessels - 55    Diabetes Mother     Stroke Mother     Heart disease Father      CABG x 4 - age 40    Cancer Brother 29     colon,liver     Colon cancer Brother 29    Cancer Brother      gastric passed age 43    Stomach cancer Brother 39    Celiac disease Neg Hx     Crohn's disease Neg Hx     Esophageal cancer Neg Hx     Inflammatory bowel disease Neg Hx     Irritable bowel syndrome Neg  Hx     Liver cancer Neg Hx     Rectal cancer Neg Hx     Ulcerative colitis Neg Hx        SocHx:  Social History     Social History    Marital status:      Spouse name: N/A    Number of children: N/A    Years of education: N/A     Occupational History    security at [employer]      Social History Main Topics    Smoking status: Current Every Day Smoker     Packs/day: 0.25     Years: 30.00     Types: Cigarettes    Smokeless tobacco: Never Used    Alcohol use Yes      Comment: every weekend - 4 beers    Drug use: No    Sexual activity: Not on file     Other Topics Concern    Not on file     Social History Narrative    No narrative on file       Distress Score    Distress Score: 3        Practical Problems Physical Problems   : No Appearance: No   Housing: No Bathing / Dressing: No   Insurance / Financial: No Breathing: No    Transportation: No  Changes in Urination: No    Work / School: Yes  Constipation: No   Treatment Decisions: No  Diarrhea: No     Eating: No    Family Problems Fatigue: No    Dealing with Children: No Feeling Swollen: No    Dealing with Partner: No Fevers: No    Ability to Have Children: No  Getting Around: No    Family Health Issues: No  Indigestion: No     Memory / Concentration: No   Emotional Problems Mouth Sores: No    Depression: No  Nausea: No    Fears: Yes  Nose Dry / Congested: No    Nervousness: No  Pain: No    Sadness: No Sexual: No    Worry: No Skin Dry / Itchy: No    Loss of Interest in Usual Activities: No Sleep: No     Substance Abuse: No    Spiritual/Religions Concerns Tingling in Hands / Feet: No   Spritual / Amish Concerns: No         Other Problems                Objective:      Physical Exam   Constitutional: He is oriented to person, place, and time. He appears well-developed. No distress.   Cachextic   HENT:   Head: Normocephalic and atraumatic.   Mouth/Throat: Oropharynx is clear and moist. No oropharyngeal exudate.   Eyes:  Conjunctivae and EOM are normal. Pupils are equal, round, and reactive to light. Right eye exhibits no discharge. Left eye exhibits no discharge. No scleral icterus.   Neck: Normal range of motion. Neck supple. No tracheal deviation present.   Cardiovascular: Normal rate, regular rhythm, normal heart sounds and intact distal pulses.  Exam reveals no gallop and no friction rub.    No murmur heard.  Pulmonary/Chest: Effort normal and breath sounds normal. No respiratory distress. He has no wheezes. He has no rales.   Abdominal: Soft. Bowel sounds are normal. He exhibits no distension. There is tenderness. There is no rebound and no guarding.   Musculoskeletal: Normal range of motion. He exhibits no edema or tenderness.   Neurological: He is alert and oriented to person, place, and time.   Skin: Skin is warm and dry. No rash noted. He is not diaphoretic. No erythema.   Psychiatric: He has a normal mood and affect. His behavior is normal. Judgment and thought content normal.   Nursing note and vitals reviewed.        Assessment:       1. Pain, cancer    2. Renal mass        Plan:         1.  Suspected metastatic cancer of unclear primary:    - Will obtain biopsy to confirm dx.  - Will also obtain CT chest.         2.  Pain:    - PRN percocet, may need to titrate depending on pain response      3. Constipation:    - Encouraged daily miralax, and OTC laxatives PRN      IR bx ASAP, RTC 1 week following bx to review results.     The patient agrees with the plan, and all questions have been answered to their satisfaction.      More than 60 mins were spent during this encounter, greater than 50% was spent in direct counseling and/or coordination of care.       Abilio Meadows M.D., M.S., F.A.C.P.  Hematology and Oncology Attending  [facility]

## 2018-02-27 NOTE — TELEPHONE ENCOUNTER
----- Message from Beau Pressley sent at 2/27/2018  3:47 PM CST -----  Contact: [facility]- [facility]   Will like a call from Bruce regarding medication oxyCODONE-acetaminophen (PERCOCET) 5-325 mg per tablet    Contact:[phone]

## 2018-02-27 NOTE — TELEPHONE ENCOUNTER
----- Message from Beau Pressley sent at 2/27/2018 10:02 AM CST -----  Contact: DaughterAbraham   States she's returning office call from today     Contact::[phone]

## 2018-02-27 NOTE — TELEPHONE ENCOUNTER
"Per scheduling note "no longer needs per oncology"  ----- Message from Soraida Jalloh RN sent at 2/27/2018 10:39 AM CST -----  Regarding: Cancellation of Order # [id number]  Order number [id number] for the procedure CASE REQUEST GI [GI5]   has been canceled by Soraida Jalloh RN [[id number]]. This   procedure was ordered by Snehal Holland NP [[id number]] on Feb 16, 2018 for the patient Teddy Caballero [[id number]]. The reason for  cancellation was "None".    "

## 2018-02-28 NOTE — LETTER
February 28, 2018      Abilio Meadows MD  [address] [facility] [address]           [facility]  [address]  [place] [address]  Phone: [phone]          Patient: Teddy Caballero   MR Number: [id number]   YOB: 1965   Date of Visit: 2/28/2018       Dear Dr. Abilio Meadows:    Thank you for referring Teddy Caballero to me for evaluation. Attached you will find relevant portions of my assessment and plan of care.    If you have questions, please do not hesitate to call me. I look forward to following Teddy Cabalelro along with you.    Sincerely,    Anais Nicholson MD    Enclosure  CC:  No Recipients    If you would like to receive this communication electronically, please contact [email or web address] or [phone] to request more information on [employer] access.    For providers and/or their staff who would like to refer a patient to [facility], please contact us through our one-stop-shop provider referral line, [facility], at [phone].    If you feel you have received this communication in error or would no longer like to receive these types of communications, please e-mail [email or web address]

## 2018-02-28 NOTE — PROGRESS NOTES
Subjective:       Patient ID: Teddy Caballero is a 52 y.o. male.    Chief Complaint: Lung Mass    HPI   Teddy Caballero is a 52 y.o. male with PMHx NTSTEMI (2014), ~35 pack year smoking history, HTN who presents to pulmonary clinic today for evaluation for EBUS per Dr. Meadows. In 2/2018 he received GI evaluation for chronic abdominal pain which was noted to have multiple lesions throughout the abdomen. This was followed up with a chest CT showing a 6.7cm spiculated left upper lobe mass as well as supraclavicular and mediastinal lymphadenopathy.     He has no prior pulmonary history and takes no inhalers. He is still an active smoker at ~2 cigarettes a day with a 35 pack year history. He denies chronic cough, shortness of breath, REBOLLAR or wheezing. He does endorse abdominal pain, worsened with food, as well as decreased appetite, generalized weakness and a ~30lb weight loss over the past year. He also endorses some nausea in the past year associated with certain foods and smells.     Of note he has a family history of gastric and colon cancer in both of his brothers.    Review of Systems   Constitutional: Positive for appetite change (decreased), fatigue and unexpected weight change. Negative for chills and fever.   HENT: Negative for congestion, rhinorrhea and sinus pressure.    Eyes: Negative for itching and visual disturbance.   Respiratory: Negative for cough, choking, shortness of breath and wheezing.    Cardiovascular: Negative for chest pain.   Gastrointestinal: Positive for abdominal pain and nausea. Negative for blood in stool, constipation, diarrhea and vomiting.   Endocrine: Negative for polydipsia and polyuria.   Genitourinary: Negative for difficulty urinating, dysuria and hematuria.   Musculoskeletal: Negative for arthralgias and myalgias.   Skin: Negative for color change and rash.   Neurological: Positive for weakness (generalized). Negative for dizziness and headaches.       Objective:       Blood  "pressure 108/68, pulse 110, height 5' 9" (1.753 m), weight 48 kg (105 lb 13.1 oz), SpO2 99 %.      Physical Exam   Constitutional: He is oriented to person, place, and time. He appears well-developed. He appears cachectic. No distress.   HENT:   Head: Normocephalic and atraumatic.   Eyes: Conjunctivae and EOM are normal. Pupils are equal, round, and reactive to light.   Neck: Normal range of motion. Neck supple. No JVD present.   Cardiovascular: Normal rate and regular rhythm.  Exam reveals no gallop and no friction rub.    No murmur heard.  Pulmonary/Chest: Effort normal. He has wheezes (faint inspiratory wheezing in all lung fields most pronounced in lower fields.). He has no rhonchi. He has no rales.   Abdominal: Soft. He exhibits no distension. There is no tenderness. There is no guarding.   Musculoskeletal: Normal range of motion. He exhibits no edema or tenderness.   Neurological: He is alert and oriented to person, place, and time.   Skin: Skin is warm and dry. Capillary refill takes less than 2 seconds. No rash noted.         No PFTs on file    Imaging Studies  CT Chest WO contrast 2/26  1.  Spiculated left upper lobe mass measuring up to 6.7 cm concerning for primary malignancy.    2.  Supraclavicular and mediastinal lymphadenopathy suggestive of metastases.    3.  Bilateral adrenal masses and ill-defined hypodense hepatic and renal lesions are suggestive of metastatic disease, better visualized on recent CT abdomen pelvis of the 2/23/2018.    4. Emphysema.    5. Coronary stent and other findings as above.    Assessment:       1. Lung mass        Plan:       Teddy was seen today for lung mass.    Diagnoses and all orders for this visit:    Lung mass  · Plan for EBUS with flexible bronchoscopy 3/6 for biopsy  · Unsure if lung represents primary given abdominal CT findings which could represent RCC.   · Lymphoma also possible diagnosis at this point.  · Discussed risks and benefits of biopsy procedure in " detail the patient and his wife who was in the room. They verbalized their understanding and agreement to the procedure. They were given the opportunity to ask questions and verbally expressed their satisfaction with the answers. Consent form was then signed by Mr. Caballero today.  · Followup and further management to be determined by biopsy results.

## 2018-03-05 NOTE — TELEPHONE ENCOUNTER
Spoke to pharmacy, they are asking for a new prescription, he was only given 7 days worth on initial fill

## 2018-03-05 NOTE — TELEPHONE ENCOUNTER
Patient is taking percocet 5-325 every 4 hours around the clock--with not much relief.  Requesting a dose adjustment and/or muscle relaxer to be prescribed.

## 2018-03-06 PROBLEM — R91.8 LUNG MASS: Status: ACTIVE | Noted: 2018-01-01

## 2018-03-06 NOTE — TRANSFER OF CARE
"Anesthesia Transfer of Care Note    Patient: Teddy Caballero    Procedure(s) Performed: Procedure(s) (LRB):  ENDOBRONCHIAL ULTRASOUND (EBUS) (N/A)  BRONCHOSCOPY (N/A)    Patient location: PACU    Anesthesia Type: general    Transport from OR: Transported from OR on 6-10 L/min O2 by face mask with adequate spontaneous ventilation    Post pain: adequate analgesia    Post assessment: no apparent anesthetic complications and tolerated procedure well    Post vital signs: stable    Level of consciousness: sedated    Nausea/Vomiting: no nausea/vomiting    Complications: none    Transfer of care protocol was followed      Last vitals:   Visit Vitals  /71 (BP Location: Right arm, Patient Position: Lying)   Pulse 88   Temp 36.6 °C (97.8 °F) (Oral)   Resp 16   Ht 5' 9" (1.753 m)   Wt 52.6 kg (116 lb)   SpO2 100%   BMI 17.13 kg/m²     "

## 2018-03-06 NOTE — BRIEF OP NOTE
[facility]  Pulmonology  Discharge Summary      Patient Name: Teddy Caballero  MRN: [id number]  Admission Date: 3/6/2018  Hospital Length of Stay: 0 days  Discharge Date and Time:  03/06/2018 8:26 AM  Attending Physician: Anais Nicholson MD   Discharging Provider: Anais Nicholson MD  Primary Care Provider: Iliana Campa NP    HPI: Patient with lung mass, mediastinal adenopathy and adrenal masses.  Bronchoscopy with mediastinal adenopathy today    Procedure(s) (LRB):  ENDOBRONCHIAL ULTRASOUND (EBUS) (N/A)  BRONCHOSCOPY (N/A)    Indwelling Lines/Drains at Time of Discharge:   Lines/Drains/Airways          No matching active lines, drains, or airways          Hospital Course: Bronchoscopy complete, tolerated well.  Prelim non small cell carcinoma        Significant Labs:  All pertinent labs within the past 24 hours have been reviewed.    Significant Imaging:  I have reviewed all pertinent imaging results/findings within the past 24 hours.    Pending Diagnostic Studies:     Procedure Component Value Units Date/Time    Cytology Specimen- Pulmonary Medical Cytology [[id number]] Collected:  03/06/18 0812    Order Status:  Sent Lab Status:  No result     Specimen:  Other specimen location (comment)         Final Active Diagnoses:    Diagnosis Date Noted POA    PRINCIPAL PROBLEM:  Lung mass [R91.8] 03/06/2018 Yes      Problems Resolved During this Admission:    Diagnosis Date Noted Date Resolved POA       Discharged Condition: stable    Disposition:     Follow Up:  Follow-up Information     Call in 1 week to follow up.               Patient Instructions:     Diet general       Medications:  Reconciled Home Medications:   Current Discharge Medication List      CONTINUE these medications which have NOT CHANGED    Details   dicyclomine (BENTYL) 10 MG capsule Take 1 capsule (10 mg total) by mouth 4 (four) times daily as needed (abdominal pains).  Qty: 120 capsule, Refills: 0    Associated Diagnoses: Upper  abdominal pain      oxyCODONE-acetaminophen (PERCOCET)  mg per tablet Take 1 tablet by mouth every 4 (four) hours as needed for Pain.  Qty: 120 tablet, Refills: 0    Associated Diagnoses: Neoplasm related pain (acute) (chronic)      pantoprazole (PROTONIX) 40 MG tablet Take 1 tablet (40 mg total) by mouth once daily.  Qty: 30 tablet, Refills: 1    Associated Diagnoses: Epigastric pain      ALPRAZolam (XANAX) 0.5 MG tablet Take 1 tablet (0.5 mg total) by mouth nightly as needed for Anxiety.  Qty: 30 tablet, Refills: 0      aspirin (ECOTRIN) 81 MG EC tablet Take 81 mg by mouth once daily.      atorvastatin (LIPITOR) 40 MG tablet Take 40 mg by mouth once daily.      fish oil-omega-3 fatty acids 300-1,000 mg capsule Take by mouth once daily.      lisinopril (PRINIVIL,ZESTRIL) 5 MG tablet Take 5 mg by mouth once daily.      multivitamin capsule Take 1 capsule by mouth once daily.      naproxen (NAPROSYN) 250 MG tablet Take 250 mg by mouth 2 (two) times daily.      ondansetron (ZOFRAN-ODT) 8 MG TbDL Take 1 tablet (8 mg total) by mouth every 8 (eight) hours as needed (nausea or vomiting - CANCER PATIENT ON CHEMO).  Qty: 30 tablet, Refills: 1    Associated Diagnoses: Pain, cancer; Renal mass             Anais Nicholson MD  Pulmonology  [facility]

## 2018-03-06 NOTE — PLAN OF CARE
Plan of care reviewed with pt & spouse, both verbalized understanding, pt progressing with plan of care, denies nausea, pain well controlled, tolerating PO, reviewed all DC instructions, home meds,  when to call MD, when to follow-up, answered questions.

## 2018-03-06 NOTE — DISCHARGE INSTRUCTIONS
Discharge Instructions after Endobronchial Ultrasound    ACTIVITY LEVEL:  If you received sedation or an anesthetic, you may feel sleepy for several hours. Do not drive, operate machinery, make critical decisions, or perform activities that require coordination or balance until tomorrow morning. Please have a responsible person stay with you for at least two (2) hours after you leave the hospital.    DIET:  Do not eat or drink anything for 1 hour. Once you can drink clear liquids without coughing, you can resume your regular diet.    WHAT you may expect over the next 24 hours:   a low grade fever.   cough up streaks of blood.  *Take Tylenol as directed for comfort/fever.    Additional Instructions:  - If you take Plavix, you may resume it tomorrow unless otherwise directed by MD.  - If you normally take Coumadin, you may restart on ________________________.  - If you normally take Eliquis, you may restart tomorrow morning unless otherwise directed by MD.  While taking blood thinners such as Coumadin or Eliquis. Do not take Aspirin, ibuprofen, naproxen, or any medications containing these items unless cleared by your MD.    COME TO THE EMERGENCY DEPARTMENT IF YOU:   cough up more than one (1) tablespoon of blood.   have fever over 101°F (38.4°C) for more than one evening or shaking chills   experience chest pain that is of new onset.   experience shortness of breath that is of new onset, or that is increased from your usual baseline.    When to call your healthcare provider  · Large amounts of blood in sputum  · Blood in sputum after 2 days  · Hoarseness that wont go away     RETURN APPOINTMENT: Follow up as directed.    FOR EMERGENCIES:  If any unusual problems or difficulties occur, contact Dr. Anais Nicholson at the Clinic office, [phone] or [phone] or the resident on call for Dr. Nicholson at [phone] (if after hours).

## 2018-03-06 NOTE — INTERVAL H&P NOTE
The patient has been examined and the H&P has been reviewed:    I concur with the findings and no changes have occurred since H&P was written.    Anesthesia/Surgery risks, benefits and alternative options discussed and understood by patient/family.          Active Hospital Problems    Diagnosis  POA    Lung mass [R91.8]  Yes      Resolved Hospital Problems    Diagnosis Date Resolved POA   No resolved problems to display.     I have explained the risks, benefits and alternatives of the procedure in detail.  The patient voices understanding and all questions have been answered.  The patient agrees to proceed as planned.

## 2018-03-07 NOTE — TELEPHONE ENCOUNTER
----- Message from Beau Pressley sent at 3/7/2018 10:37 AM CST -----  Contact: [facility]   Will like to know diagnostic code on pt     Contact::[phone] Option 1

## 2018-03-07 NOTE — ANESTHESIA PREPROCEDURE EVALUATION
03/07/2018  Teddy Caballero is a 52 y.o., male.    Anesthesia Evaluation    I have reviewed the Patient Summary Reports.     I have reviewed the Medications.     Review of Systems  Anesthesia Hx:  History of prior surgery of interest to airway management or planning:  Denies Personal Hx of Anesthesia complications.   Social:  Non-Smoker, No Alcohol Use    Hematology/Oncology:  Hematology Normal        EENT/Dental:EENT/Dental Normal   Cardiovascular:   Exercise tolerance: good Hypertension Past MI (s/p stents) CAD asymptomatic     Pulmonary:  Pulmonary Normal    Renal/:  Renal/ Normal     Hepatic/GI:  Hepatic/GI Normal    Musculoskeletal:  Musculoskeletal Normal    Neurological:  Neurology Normal    Dermatological:  Skin Normal    Psych:  Psychiatric Normal           Physical Exam  General:  Malnutrition    Airway/Jaw/Neck:  Airway Findings: Mouth Opening: Normal Tongue: Normal  General Airway Assessment: Adult, Good  Mallampati: III  TM Distance: Normal, at least 6 cm     Eyes/Ears/Nose:  EYES/EARS/NOSE FINDINGS: Normal   Dental:  Dental Findings: In tact   Chest/Lungs:  Chest/Lungs Findings: Clear to auscultation, Normal Respiratory Rate     Heart/Vascular:  Heart Findings: Rate: Normal  Rhythm: Regular Rhythm  Sounds: Normal  Heart murmur: negative       Mental Status:  Mental Status Findings:  Cooperative, Alert and Oriented, Flat Affect         Anesthesia Plan  Type of Anesthesia, risks & benefits discussed:  Anesthesia Type:  general  Patient's Preference:   Intra-op Monitoring Plan:   Intra-op Monitoring Plan Comments:   Post Op Pain Control Plan:   Post Op Pain Control Plan Comments:   Induction:   IV  Beta Blocker:  Patient is not currently on a Beta-Blocker (No further documentation required).       Informed Consent: Patient understands risks and agrees with Anesthesia plan.  Questions  answered. Anesthesia consent signed with patient.  ASA Score: 2     Day of Surgery Review of History & Physical:    H&P update referred to the provider.     Anesthesia Plan Notes:   52M asymp CAD/MI/stents, HTN, lung mass for EBUS        Ready For Surgery From Anesthesia Perspective.

## 2018-03-07 NOTE — ANESTHESIA POSTPROCEDURE EVALUATION
"Anesthesia Post Evaluation    Patient: Teddy Caballero    Procedure(s) Performed: Procedure(s) (LRB):  ENDOBRONCHIAL ULTRASOUND (EBUS) (N/A)  BRONCHOSCOPY (N/A)    Final Anesthesia Type: general  Patient location during evaluation: PACU  Patient participation: Yes- Able to Participate  Level of consciousness: awake and alert  Pain management: adequate  Airway patency: patent  PONV status at discharge: No PONV  Anesthetic complications: no      Cardiovascular status: blood pressure returned to baseline  Respiratory status: unassisted, spontaneous ventilation and room air  Hydration status: euvolemic  Follow-up not needed.        Visit Vitals  /79   Pulse 76   Temp 36.2 °C (97.2 °F) (Temporal)   Resp 18   Ht 5' 9" (1.753 m)   Wt 52.6 kg (116 lb)   SpO2 100%   BMI 17.13 kg/m²       Pain/Bethel Score: Pain Assessment Performed: Yes (3/6/2018  9:07 AM)  Presence of Pain: denies (3/6/2018  9:07 AM)  Modified Bethel Score: 20 (3/6/2018  9:07 AM)      "

## 2018-03-12 PROBLEM — R91.8 LUNG MASS: Status: RESOLVED | Noted: 2018-01-01 | Resolved: 2018-01-01

## 2018-03-12 NOTE — PROGRESS NOTES
"Subjective:       Patient ID: Teddy Caballero is a 52 y.o. male.    Chief Complaint: Metastatic cancer    HPI     52 year old male to clinic for follow-up after recent biopsy with Dr. Nicholson. Pt referred from GI after evaluation for abd pain revealed what appeared to be metastatic disease throughout the abdomen, including adrenal, renal mass, LNs, and questionable liver met.      Bx c/w NSCLC - Adeno - [id number] showed no actionable mutations.  Awaiting PD-L1 status.      Notes 30 lb wt loss over the last few months, including 10 lbs in the last week or so.  Notes abdominal pain, constipation, nausea, etc.      PS = 1-2     2/23/18- CT Abdomen/Pelvis "Large bilateral adrenal masses with mesenteric lymphadenopathy highly concerning for metastatic disease. Bilateral hypodense renal lesions which appear to enhance highly concerning for neoplasm such as renal cell carcinoma. Consider further evaluation with CT of the abdomen with and without contrast renal mass protocol.    2 small ill-defined hepatic hypodensities which are not cystic and do not have the enhancing pattern of a hemangioma highly concerning for metastatic disease."    2/26/18- CT Chest " Spiculated left upper lobe mass measuring up to 6.7 cm concerning for primary malignancy.Supraclavicular and mediastinal lymphadenopathy suggestive of metastases.Bilateral adrenal masses and ill-defined hypodense hepatic and renal lesions are suggestive of metastatic disease, better visualized on recent CT abdomen pelvis of the 2/23/2018."    CLINICAL DIAGNOSIS/INFORMATION  Clinical information: Left upper lung mass mediastinal adenopathy  Gross Description  #1(4R): 3 AD SLIDES, 3 FX SLIDES, 1 FORMALIN JAR;TH  6 SLIDES, 1 CELL BLOCK  #2(STA7): 3 AD SLIDES, 3 FX SLIDES, 1 FORMALIN JAR;TH  6 SLIDES, 1 CELL BLOCK  3 PASSES ([facility])  FNA Information  SPECIMEN #1, PASSES 1-3: Positive for malignancy. Non-small cell carcinoma.    Review of Systems   Constitutional: Positive for " activity change, appetite change, fatigue and unexpected weight change. Negative for chills and fever.   HENT: Negative for congestion, hearing loss, mouth sores, sore throat and trouble swallowing.    Eyes: Negative for pain and visual disturbance.   Respiratory: Negative for cough, shortness of breath and wheezing.    Cardiovascular: Negative for chest pain, palpitations and leg swelling.   Gastrointestinal: Positive for abdominal pain, constipation and nausea. Negative for diarrhea and vomiting.   Endocrine: Negative for cold intolerance and heat intolerance.   Genitourinary: Negative for difficulty urinating, discharge, dysuria, enuresis, frequency, hematuria, scrotal swelling and testicular pain.   Musculoskeletal: Negative for arthralgias and myalgias.   Skin: Negative for color change, rash and wound.   Allergic/Immunologic: Negative for environmental allergies and food allergies.   Neurological: Negative for weakness, numbness and headaches.   Hematological: Negative for adenopathy. Does not bruise/bleed easily.   Psychiatric/Behavioral: Negative for confusion, hallucinations and sleep disturbance. The patient is not nervous/anxious.    All other systems reviewed and are negative.      Allergies:  Review of patient's allergies indicates:   Allergen Reactions    Motrin [ibuprofen] Swelling       Medications:  Current Outpatient Prescriptions   Medication Sig Dispense Refill    ALPRAZolam (XANAX) 0.5 MG tablet Take 1 tablet (0.5 mg total) by mouth nightly as needed for Anxiety. 30 tablet 0    aspirin (ECOTRIN) 81 MG EC tablet Take 81 mg by mouth once daily.      atorvastatin (LIPITOR) 40 MG tablet Take 40 mg by mouth once daily.      dicyclomine (BENTYL) 10 MG capsule Take 1 capsule (10 mg total) by mouth 4 (four) times daily as needed (abdominal pains). 120 capsule 0    fish oil-omega-3 fatty acids 300-1,000 mg capsule Take by mouth once daily.      lisinopril (PRINIVIL,ZESTRIL) 5 MG tablet Take 5 mg  by mouth once daily.      multivitamin capsule Take 1 capsule by mouth once daily.      naproxen (NAPROSYN) 250 MG tablet Take 250 mg by mouth 2 (two) times daily.      ondansetron (ZOFRAN-ODT) 8 MG TbDL Take 1 tablet (8 mg total) by mouth every 8 (eight) hours as needed (nausea or vomiting - CANCER PATIENT ON CHEMO). 30 tablet 1    oxyCODONE-acetaminophen (PERCOCET)  mg per tablet Take 1 tablet by mouth every 4 (four) hours as needed for Pain. 120 tablet 0    pantoprazole (PROTONIX) 40 MG tablet Take 1 tablet (40 mg total) by mouth once daily. 30 tablet 1     No current facility-administered medications for this visit.        PMH:  Past Medical History:   Diagnosis Date    Coronary artery disease 11/2014    stent x 1 in Nov. 2014; followed by Dr. Wei    Hyperlipidemia     Hypertension     NSTEMI (non-ST elevated myocardial infarction) 11/07/2014       PSH:  Past Surgical History:   Procedure Laterality Date    CORONARY ANGIOPLASTY WITH STENT PLACEMENT  11/2014    dr. Wei    stent placed  11/07/2014    on placed.       FamHx:  Family History   Problem Relation Age of Onset    Heart disease Mother      CABG x 3 vessels - 55    Diabetes Mother     Stroke Mother     Heart disease Father      CABG x 4 - age 40    Cancer Brother 29     colon,liver     Colon cancer Brother 29    Cancer Brother      gastric passed age 43    Stomach cancer Brother 39    Celiac disease Neg Hx     Crohn's disease Neg Hx     Esophageal cancer Neg Hx     Inflammatory bowel disease Neg Hx     Irritable bowel syndrome Neg Hx     Liver cancer Neg Hx     Rectal cancer Neg Hx     Ulcerative colitis Neg Hx        SocHx:  Social History     Social History    Marital status:      Spouse name: N/A    Number of children: N/A    Years of education: N/A     Occupational History    security at [employer]      Social History Main Topics    Smoking status: Current Every Day Smoker     Packs/day: 0.25      Years: 30.00     Types: Cigarettes    Smokeless tobacco: Never Used    Alcohol use No      Comment: pt states he quit September 2017    Drug use: No    Sexual activity: Not on file     Other Topics Concern    Not on file     Social History Narrative    No narrative on file       Objective:      Physical Exam   Constitutional: He is oriented to person, place, and time. He appears well-developed. No distress.   Cachextic   HENT:   Head: Normocephalic and atraumatic.   Mouth/Throat: Oropharynx is clear and moist. No oropharyngeal exudate.   Eyes: Conjunctivae and EOM are normal. Pupils are equal, round, and reactive to light. Right eye exhibits no discharge. Left eye exhibits no discharge. No scleral icterus.   Neck: Normal range of motion. Neck supple. No tracheal deviation present.   Cardiovascular: Normal rate, regular rhythm, normal heart sounds and intact distal pulses.  Exam reveals no gallop and no friction rub.    No murmur heard.  Pulmonary/Chest: Effort normal and breath sounds normal. No respiratory distress. He has no wheezes. He has no rales.   Abdominal: Soft. Bowel sounds are normal. He exhibits no distension. There is tenderness. There is no rebound and no guarding.   Musculoskeletal: Normal range of motion. He exhibits no edema or tenderness.   Neurological: He is alert and oriented to person, place, and time.   Skin: Skin is warm and dry. No rash noted. He is not diaphoretic. No erythema.   Psychiatric: He has a normal mood and affect. His behavior is normal. Judgment and thought content normal.   Nursing note and vitals reviewed.      LABS:  WBC   Date Value Ref Range Status   02/21/2018 13.12 (H) 3.90 - 12.70 K/uL Final     Hemoglobin   Date Value Ref Range Status   02/21/2018 12.2 (L) 14.0 - 18.0 g/dL Final     Hematocrit   Date Value Ref Range Status   02/21/2018 36.5 (L) 40.0 - 54.0 % Final     Platelets   Date Value Ref Range Status   02/21/2018 561 (H) 150 - 350 K/uL Final        Chemistry        Component Value Date/Time     02/21/2018 0934    K 3.8 02/21/2018 0934     02/21/2018 0934    CO2 28 02/21/2018 0934    BUN 17 02/21/2018 0934    CREATININE 0.69 02/21/2018 0934    GLU 90 02/21/2018 0934        Component Value Date/Time    CALCIUM 9.8 02/21/2018 0934    ALKPHOS 85 02/21/2018 0934    AST 22 02/21/2018 0934    ALT 24 02/21/2018 0934    BILITOT 0.3 02/21/2018 0934    ESTGFRAFRICA >60.0 02/21/2018 0934    EGFRNONAA >60.0 02/21/2018 0934            Assessment:       1. Non-small cell cancer of left lung    2. Renal mass    3. Liver metastasis    4. 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 Metastatic cancer to intrathoracic lymph nodes    6. Encounter for chemotherapy management    7. Anemia in neoplastic disease    8. Neoplasm related pain (acute) (chronic)        Plan:       1. Metastatic NSCLC (Adenocarcinoma), no actionable mutations by serum NGS, pos PD-L1 status.       - Discussed incurable nature of disease and rationale for palliative systemic therapy - PD-L1 = 50% per NCCN guidelines will start Keytruda.    - Refer for port, chemo class  - Refer to nutrition.      2. Pain:  - Added MS Contin 30mg BID, con't PRN percocet    3. Constipation:  - Passing karuna.   - Likely secondary to opioids, added lactulose    4. Appetite:  - Will try periactin, although will likely improve with bowel movement    Schedule for port and appt with nutrition and for chemo class ASAP.  RTC next week with labs to see me and to begin chemo/immunotherapy.     The patient agrees with the plan, and all questions have been answered to their satisfaction.      More than 45 mins were spent during this encounter, greater than 50% was spent in direct counseling and/or coordination of care.     Abilio Meadows M.D., M.S., F.A.C.P.  Hematology and Oncology Attending  [facility]

## 2018-03-14 PROBLEM — C80.1 ADENOCARCINOMA: Status: ACTIVE | Noted: 2018-01-01

## 2018-03-14 PROBLEM — C79.70 MALIGNANT NEOPLASM METASTATIC TO ADRENAL GLAND: Status: ACTIVE | Noted: 2018-01-01

## 2018-03-14 PROBLEM — C34.92 NON-SMALL CELL CANCER OF LEFT LUNG: Status: ACTIVE | Noted: 2018-01-01

## 2018-03-14 PROBLEM — C77.1 METASTATIC CANCER TO INTRATHORACIC LYMPH NODES: Status: ACTIVE | Noted: 2018-01-01

## 2018-03-14 PROBLEM — C78.7 LIVER METASTASIS: Status: ACTIVE | Noted: 2018-01-01

## 2018-03-14 NOTE — Clinical Note
Schedule for port and appt with nutrition and for chemo class ASAP.  RTC next week with labs to see me and to begin chemo/immunotherapy.

## 2018-03-20 NOTE — PROGRESS NOTES
Patient to chemo class via wheelchair w/ wife. [facility] binder along w/ treatment regime handouts (Keytruda) provided. Chemo class video viewed. Business card provided to physician specific . An opportunity provided to ask questions; questions answered. Class concluded with tour of chemo suite on 5th floor.

## 2018-03-20 NOTE — PROGRESS NOTES
"Medical Nutrition Therapy Oncology Progress Note    Name: Teddy Caballero MRN: [id number]  : 1965    Age: 52 y.o.  Ethnicity: /White Language: English    Diagnosis: No diagnosis found.    Chemo Regimen: Keytruda   Referring MD: Dr. Meadows Frequency:  Radiation: No           Goal of Cancer treatment n/a         Nutrition Assessment     Chief Complaint:   Chief Complaint   Patient presents with    Nutrition Counseling    Lung Cancer        Anthropometric Measurements:  Height: 5' 9" (1.753 m)  Current Weight: 45 kg (99 lb 3.3 oz)  Ideal Body Weight: 160#  Percent Ideal Body Weight:: 61%  BMI: Body mass index is 14.65 kg/m².     Weight History:   Wt Readings from Last 8 Encounters:   18 45 kg (99 lb 3.3 oz)   18 45 kg (99 lb 3.3 oz)   18 52.6 kg (116 lb)   18 48 kg (105 lb 13.1 oz)   18 49.3 kg (108 lb 11 oz)   18 53.8 kg (118 lb 9.7 oz)   18 56 kg (123 lb 7.3 oz)   17 60.7 kg (133 lb 13.1 oz)       Medical Health History:  Feeding tube placed: No  Pre-treatment: Yes    Past Surgical History:   Procedure Laterality Date    CORONARY ANGIOPLASTY WITH STENT PLACEMENT  2014    dr. Wei    stent placed  2014    on placed.        Medications:   Current Outpatient Prescriptions:     ALPRAZolam (XANAX) 0.5 MG tablet, Take 1 tablet (0.5 mg total) by mouth nightly as needed for Anxiety., Disp: 30 tablet, Rfl: 0    aspirin (ECOTRIN) 81 MG EC tablet, Take 81 mg by mouth once daily., Disp: , Rfl:     atorvastatin (LIPITOR) 40 MG tablet, Take 40 mg by mouth once daily., Disp: , Rfl:     cyproheptadine (PERIACTIN) 4 mg tablet, Take 1 tablet (4 mg total) by mouth 3 (three) times daily as needed. For appetite, Disp: 90 tablet, Rfl: 0    fish oil-omega-3 fatty acids 300-1,000 mg capsule, Take by mouth once daily., Disp: , Rfl:     lactulose (CHRONULAC) 20 gram/30 mL Soln, Take 45 mLs (30 g total) by mouth 3 (three) times daily., Disp: 4050 mL, Rfl: " 0    lisinopril (PRINIVIL,ZESTRIL) 5 MG tablet, Take 5 mg by mouth once daily., Disp: , Rfl:     morphine (MS CONTIN) 15 MG 12 hr tablet, Take 1 tablet (15 mg total) by mouth 2 (two) times daily., Disp: 30 tablet, Rfl: 0    multivitamin capsule, Take 1 capsule by mouth once daily., Disp: , Rfl:     naproxen (NAPROSYN) 250 MG tablet, Take 250 mg by mouth 2 (two) times daily., Disp: , Rfl:     ondansetron (ZOFRAN-ODT) 8 MG TbDL, Take 1 tablet (8 mg total) by mouth every 8 (eight) hours as needed (nausea or vomiting - CANCER PATIENT ON CHEMO)., Disp: 30 tablet, Rfl: 1    oxyCODONE-acetaminophen (PERCOCET)  mg per tablet, Take 1 tablet by mouth every 4 (four) hours as needed for Pain., Disp: 120 tablet, Rfl: 0    pantoprazole (PROTONIX) 40 MG tablet, Take 1 tablet (40 mg total) by mouth once daily., Disp: 30 tablet, Rfl: 1    traZODone (DESYREL) 50 MG tablet, Take 1 tablet (50 mg total) by mouth every evening., Disp: 30 tablet, Rfl: 3    Last Labs:  Last Labs:  Glucose   Date Value Ref Range Status   03/19/2018 103 70 - 110 mg/dL Final   03/14/2018 101 70 - 110 mg/dL Final     BUN, Bld   Date Value Ref Range Status   03/19/2018 53 (H) 6 - 20 mg/dL Final   03/14/2018 58 (H) 6 - 20 mg/dL Final     Creatinine   Date Value Ref Range Status   03/19/2018 1.2 0.5 - 1.4 mg/dL Final   03/14/2018 1.4 0.5 - 1.4 mg/dL Final     Sodium   Date Value Ref Range Status   03/19/2018 127 (L) 136 - 145 mmol/L Final   03/14/2018 131 (L) 136 - 145 mmol/L Final     Potassium   Date Value Ref Range Status   03/19/2018 5.5 (H) 3.5 - 5.1 mmol/L Final     Comment:     *No Visible Hemolysis   03/14/2018 5.5 (H) 3.5 - 5.1 mmol/L Final     Comment:     *No Visible Hemolysis     No results found for: PHOS  Calcium   Date Value Ref Range Status   03/19/2018 11.0 (H) 8.7 - 10.5 mg/dL Final   03/14/2018 11.0 (H) 8.7 - 10.5 mg/dL Final     No results found for: PREALBUMIN  Total Protein   Date Value Ref Range Status   03/19/2018 7.9 6.0 -  8.4 g/dL Final   03/14/2018 8.2 6.0 - 8.4 g/dL Final     No results found for: CHOL  No results found for: HGBA1C  Hemoglobin   Date Value Ref Range Status   03/19/2018 14.2 14.0 - 18.0 g/dL Final   03/14/2018 13.6 (L) 14.0 - 18.0 g/dL Final     Hematocrit   Date Value Ref Range Status   03/19/2018 41.6 40.0 - 54.0 % Final   03/14/2018 39.6 (L) 40.0 - 54.0 % Final     No results found for: IRON  No components found for: FROLATE  No results found for: OVADNHGG98MP  WBC   Date Value Ref Range Status   03/19/2018 14.34 (H) 3.90 - 12.70 K/uL Final   03/14/2018 14.76 (H) 3.90 - 12.70 K/uL Final         Client History/Food Access:    Living Situation: Lives with spouse   Who: Shops for Groceries? Spouse   Who : Prepares meals? Patient and Spouse   Who: Fills prescriptions? Patient and Spouse   Are there financial difficulties purchasing food? No   Personal History (occupation, physical activity level, exercise):      Baseline for Outcomes Monitoring  Food and Nutrition History: Pt here with wife for nutrition counseling. Pt with current weight of 99#, although pt refused to be weighed today. Pt reports 30# weight loss since Dec. Pt complains of decreased appetite and constipation. Pt prescribed Periactin, but has not started taking it. Encouraged pt to do so. Provided pt with handout on increasing fiber. Discussed importance of adequate calories and protein to promote weight gain/maintenance. Pt states he throws up when he drinks Ensure original. Pt unable to find Boost Breeze in stores. [facility]. Encouraged pt to drink 2/day. Discussed high calorie, high protein liquids and soft foods. Encouraged frequent snacks throughout the day. Compliance is poor.  24-hour recall:  Breakfast: fruit cup (2)  Snack: V8 original  Lunch: cereal and milk (raisin bran)  Dinner: Ensure clear    Nutritional Needs:  Estimated Needs Method Use    1600 kcal/day [] Predictive Equation:  Solorzano-Randolph   [x]  35 kcal/kg   Protein 60 g 1.2 gm/kg/day   Fluid 1400 ml 30 ml/kg/day     Food/Nutrient Intake (oral, enteral or parenteral) and Patient Behaviors     Calorie intake: Inadequate   Protein intake: Inadequate     Yes/No    Yes Uses medical food supplements   Yes Cooking techniques to minimize fatigue   Yes Currently modifying food textures   No Able to maintain usual physical actiivty     Nutrition Diagnosis     Nutrition Diagnosis Related to (Etiology) As Evidenced By (Signs/Symptoms)   Inadequate energy intake Physiological causes Estimated intake less than estimated needs   Malnutrition Physiological causes 20% weight loss in 3 months; less than 75% estimated intake in over 1 month; muscle and fat loss            Nutritional Intervention and Prescription        Nutrition Intervention Medical food supplement: Commercial beverage   Goals/Expected Outcomes Pt will drink 2 Boost Breeze each day for 500 calories and 18 g protein.   Progress Initial     Nutrition Intervention Energy-modified diet, Protein-modified diet and Schedule of food/fluids   Goals/Expected Outcomes Pt to consume frequent small snacks throughout the day.  Pt to choose high calorie, high protein liquids and soft foods for adequate calories and protein.  Pt to start taking Periactin to help increase appetite.   Progress Initial     Nutrition Intervention Fiber-modified diet   Goals/Expected Outcomes Pt to increase fiber.   Progress Progressing towards goal     Pt needs education? yes (see intervention)    Coordination of Nutrition Care: Comments:   Collaboration with other providers MD         Monitoring and Evaluation     Monitor: weight    Next Visit: PRN    Materials Provided:  1. RD contact information 2. Nutrition therapy for constipation               Total time: 30 minutes    Nutrition Score:      ©2010 [employer] Oncology Toolkit   Answers for HPI/ROS submitted by the patient on 3/19/2018    appetite change : Yes  unexpected weight change: Yes  visual disturbance: No  cough: Yes  shortness of breath: Yes  chest pain: No  abdominal pain: Yes  diarrhea: No  frequency: No  back pain: Yes  rash: No  headaches: No  adenopathy: Yes  nervous/ anxious: No

## 2018-03-22 PROBLEM — R63.0 DECREASED APPETITE: Status: ACTIVE | Noted: 2018-01-01

## 2018-03-22 NOTE — PLAN OF CARE
Problem: Patient Care Overview  Goal: Discharge Needs Assessment  Outcome: Ongoing (interventions implemented as appropriate)  Pt tolerated 2L IVF well. PIV removed cath tip intact dry dressing in place. will rtn to hospital 3/23/18 for port placement, 2 tab 10/325 mg percocet po taken during visit for c/o abdominal pain 8/10 beginning to subside 6/10. Pt assisted to transport w/c leaves clinic accompanied by wife AVS given. Instructed to contact MD office with any questions or concerns all questions answered to pt family satisfaction.

## 2018-03-23 NOTE — PROCEDURES
Radiology Post-Procedure Note    Pre Op Diagnosis: metastatic lung cancer  Post Op Diagnosis: Same    Procedure: right chest port placement.    Procedure performed by: Pbalo Ledbetter MD; Carlos Kevin MD (res)    Written Informed Consent Obtained: Yes  Specimen Removed: NO  Estimated Blood Loss: Minimal    Findings:   Under ultrasound and fluoroscopic guidance, a right chest port was placed, connected to an 8F RIJ catheter. Incidental note made of partially occlusive RIJ thrombus. See imaging report for details.    Patient tolerated procedure well.    Carlos Kevin MD  Department of Radiology  Pager: [phone]

## 2018-03-23 NOTE — PROGRESS NOTES
Patient and wife given discharge instructions and verbalized understanding. Patient escorted to lobby in wheelchair.

## 2018-03-23 NOTE — H&P
Radiology History & Physical      SUBJECTIVE:     Chief Complaint: metastatic lung cancer    History of Present Illness:  Teddy Caballero is a 52 y.o. male who presents for right chest port placement.     Past Medical History:   Diagnosis Date    Coronary artery disease 11/2014    stent x 1 in Nov. 2014; followed by Dr. Wei    Hyperlipidemia     Hypertension     NSTEMI (non-ST elevated myocardial infarction) 11/07/2014     Past Surgical History:   Procedure Laterality Date    CORONARY ANGIOPLASTY WITH STENT PLACEMENT  11/2014    dr. Wei    stent placed  11/07/2014    on placed.       Home Meds:   Prior to Admission medications    Medication Sig Start Date End Date Taking? Authorizing Provider   ALPRAZolam (XANAX) 0.5 MG tablet Take 1 tablet (0.5 mg total) by mouth nightly as needed for Anxiety. 2/28/18 3/30/18 Yes Anais Nicholson MD   morphine (MS CONTIN) 15 MG 12 hr tablet Take 1 tablet (15 mg total) by mouth 2 (two) times daily. 3/19/18  Yes Seda Worrell NP   oxyCODONE-acetaminophen (PERCOCET)  mg per tablet Take 1 tablet by mouth every 4 (four) hours as needed for Pain. 3/14/18 3/14/19 Yes Abilio Meadows MD   traZODone (DESYREL) 50 MG tablet Take 1 tablet (50 mg total) by mouth every evening. 3/14/18 3/14/19 Yes Abilio Meadows MD   aspirin (ECOTRIN) 81 MG EC tablet Take 81 mg by mouth once daily.    Historical Provider, MD   atorvastatin (LIPITOR) 40 MG tablet Take 40 mg by mouth once daily.    Historical Provider, MD   cyproheptadine (PERIACTIN) 4 mg tablet Take 1 tablet (4 mg total) by mouth 3 (three) times daily as needed. For appetite 3/14/18   Abilio Meadows MD   fish oil-omega-3 fatty acids 300-1,000 mg capsule Take by mouth once daily.    Historical Provider, MD   lactulose (CHRONULAC) 20 gram/30 mL Soln Take 45 mLs (30 g total) by mouth 3 (three) times daily. 3/14/18 4/13/18  Abilio Meadows MD   lisinopril (PRINIVIL,ZESTRIL) 5 MG tablet Take 5 mg by mouth once daily.    Historical  Provider, MD   multivitamin capsule Take 1 capsule by mouth once daily.    Historical Provider, MD   naproxen (NAPROSYN) 250 MG tablet Take 250 mg by mouth 2 (two) times daily.    Historical Provider, MD   ondansetron (ZOFRAN-ODT) 8 MG TbDL Take 1 tablet (8 mg total) by mouth every 8 (eight) hours as needed (nausea or vomiting - CANCER PATIENT ON CHEMO). 2/26/18 2/26/19  Abilio Meadows MD   pantoprazole (PROTONIX) 40 MG tablet Take 1 tablet (40 mg total) by mouth once daily. 2/2/18 2/2/19  Iliana Campa, JESSIE     Anticoagulants/Antiplatelets: no anticoagulation    Allergies:   Review of patient's allergies indicates:   Allergen Reactions    Motrin [ibuprofen] Swelling     Sedation History:  no adverse reactions    Review of Systems:   As documented in primary provider H&P.      OBJECTIVE:     Vital Signs (Most Recent)       Physical Exam:  ASA: 3  Mallampati: 1      Laboratory  Lab Results   Component Value Date    INR 1.1 03/23/2018       Lab Results   Component Value Date    WBC 14.34 (H) 03/19/2018    HGB 14.2 03/19/2018    HCT 41.6 03/19/2018    MCV 89 03/19/2018     (H) 03/19/2018      Lab Results   Component Value Date     03/19/2018     (L) 03/19/2018    K 5.5 (H) 03/19/2018    CL 91 (L) 03/19/2018    CO2 25 03/19/2018    BUN 53 (H) 03/19/2018    CREATININE 1.2 03/19/2018    CALCIUM 11.0 (H) 03/19/2018    ALT 10 03/19/2018    AST 16 03/19/2018    ALBUMIN 3.4 (L) 03/19/2018    BILITOT 0.5 03/19/2018       ASSESSMENT/PLAN:     Sedation Plan: moderate  Patient will undergo right chest port placement.

## 2018-03-23 NOTE — DISCHARGE INSTRUCTIONS
"For scheduling: Call [facility] at [phone]    For questions or concerns call: [facility] 8 AM- 5PM: [phone].   **After hours and weekends: Call [phone] and ask for "Radiology Resident on call".    For immediate concerns that are not emergent, you may call our radiology clinic at: [phone]      "

## 2018-03-23 NOTE — DISCHARGE SUMMARY
Radiology Discharge Summary      Hospital Course: No complications    Admit Date: 3/23/2018  Discharge Date: 03/23/2018     Instructions Given to Patient: Yes  Diet: Resume prior diet  Activity: activity as tolerated    Description of Condition on Discharge: Stable  Vital Signs (Most Recent): Temp: 98.1 °F (36.7 °C) (03/23/18 1001)  Pulse: 81 (03/23/18 1215)  Resp: 13 (03/23/18 1215)  BP: 121/85 (03/23/18 1215)  SpO2: 98 % (03/23/18 1215)    Discharge Disposition: Home    Discharge Diagnosis: metastatic lung cancer     Follow-up: per ordering provider    Carlos Kevin MD  Department of Radiology  Pager: [phone]

## 2018-03-23 NOTE — PROGRESS NOTES
Right chest port placement completed. Dressing applied. Site CDI. No acute events. Pt to [facility] for recovery.

## 2018-03-23 NOTE — PLAN OF CARE
1020-Two stage two decubitus noted to sacral area. Reddened sacral area with two small scabs. Pt repositioned and Mepore dressing placed. Pt states that it does not hurt. Instructed spouse and pt on importance of repostitoning every two hours to prevent breakdown.

## 2018-03-26 NOTE — PROGRESS NOTES
"Subjective:       Patient ID: Teddy Caballero is a 52 y.o. male.    Chief Complaint: Metastatic cancer    HPI     52 year old male to clinic for follow-up and to begin immunotherapy with Keytruda.   Notes weight loss, joseph ear fullness.        Oncologic History:    Pt referred from GI after evaluation for abd pain revealed what appeared to be metastatic disease throughout the abdomen, including adrenal, renal mass, LNs, and questionable liver met.      Bx c/w NSCLC - Adeno - [id number] showed no actionable mutations.  Awaiting PD-L1 status.      Notes 30 lb wt loss over the last few months, including 10 lbs in the last week or so.  Notes abdominal pain, constipation, nausea, etc.      PS = 1-2     2/23/18- CT Abdomen/Pelvis "Large bilateral adrenal masses with mesenteric lymphadenopathy highly concerning for metastatic disease. Bilateral hypodense renal lesions which appear to enhance highly concerning for neoplasm such as renal cell carcinoma. Consider further evaluation with CT of the abdomen with and without contrast renal mass protocol.    2 small ill-defined hepatic hypodensities which are not cystic and do not have the enhancing pattern of a hemangioma highly concerning for metastatic disease."    2/26/18- CT Chest " Spiculated left upper lobe mass measuring up to 6.7 cm concerning for primary malignancy.Supraclavicular and mediastinal lymphadenopathy suggestive of metastases.Bilateral adrenal masses and ill-defined hypodense hepatic and renal lesions are suggestive of metastatic disease, better visualized on recent CT abdomen pelvis of the 2/23/2018."    CLINICAL DIAGNOSIS/INFORMATION  Clinical information: Left upper lung mass mediastinal adenopathy  Gross Description  #1(4R): 3 AD SLIDES, 3 FX SLIDES, 1 FORMALIN JAR;TH  6 SLIDES, 1 CELL BLOCK  #2(STA7): 3 AD SLIDES, 3 FX SLIDES, 1 FORMALIN JAR;TH  6 SLIDES, 1 CELL BLOCK  3 PASSES (SK)  FNA Information  SPECIMEN #1, PASSES 1-3: Positive for malignancy. " Non-small cell carcinoma.    Review of Systems   Constitutional: Positive for activity change, appetite change, fatigue and unexpected weight change. Negative for chills and fever.   HENT: Negative for congestion, hearing loss, mouth sores, sore throat and trouble swallowing.    Eyes: Negative for pain and visual disturbance.   Respiratory: Negative for cough, shortness of breath and wheezing.    Cardiovascular: Negative for chest pain, palpitations and leg swelling.   Gastrointestinal: Positive for abdominal pain, constipation and nausea. Negative for diarrhea and vomiting.   Endocrine: Negative for cold intolerance and heat intolerance.   Genitourinary: Negative for difficulty urinating, discharge, dysuria, enuresis, frequency, hematuria, scrotal swelling and testicular pain.   Musculoskeletal: Negative for arthralgias and myalgias.   Skin: Negative for color change, rash and wound.   Allergic/Immunologic: Negative for environmental allergies and food allergies.   Neurological: Negative for weakness, numbness and headaches.   Hematological: Negative for adenopathy. Does not bruise/bleed easily.   Psychiatric/Behavioral: Negative for confusion, hallucinations and sleep disturbance. The patient is not nervous/anxious.    All other systems reviewed and are negative.      Allergies:  Review of patient's allergies indicates:   Allergen Reactions    Motrin [ibuprofen] Swelling       Medications:  Current Outpatient Prescriptions   Medication Sig Dispense Refill    ALPRAZolam (XANAX) 0.5 MG tablet Take 1 tablet (0.5 mg total) by mouth nightly as needed for Anxiety. 30 tablet 0    aspirin (ECOTRIN) 81 MG EC tablet Take 81 mg by mouth once daily.      atorvastatin (LIPITOR) 40 MG tablet Take 40 mg by mouth once daily.      cyproheptadine (PERIACTIN) 4 mg tablet Take 1 tablet (4 mg total) by mouth 3 (three) times daily as needed. For appetite 90 tablet 0    fish oil-omega-3 fatty acids 300-1,000 mg capsule Take by  mouth once daily.      lactulose (CHRONULAC) 20 gram/30 mL Soln Take 45 mLs (30 g total) by mouth 3 (three) times daily. 4050 mL 0    lisinopril (PRINIVIL,ZESTRIL) 5 MG tablet Take 5 mg by mouth once daily.      morphine (MS CONTIN) 15 MG 12 hr tablet Take 1 tablet (15 mg total) by mouth 2 (two) times daily. 30 tablet 0    multivitamin capsule Take 1 capsule by mouth once daily.      naproxen (NAPROSYN) 250 MG tablet Take 250 mg by mouth 2 (two) times daily.      ondansetron (ZOFRAN-ODT) 8 MG TbDL Take 1 tablet (8 mg total) by mouth every 8 (eight) hours as needed (nausea or vomiting - CANCER PATIENT ON CHEMO). 30 tablet 1    oxyCODONE-acetaminophen (PERCOCET)  mg per tablet Take 1 tablet by mouth every 4 (four) hours as needed for Pain. 120 tablet 0    pantoprazole (PROTONIX) 40 MG tablet Take 1 tablet (40 mg total) by mouth once daily. 30 tablet 1    traZODone (DESYREL) 50 MG tablet Take 1 tablet (50 mg total) by mouth every evening. 30 tablet 3     No current facility-administered medications for this visit.        PMH:  Past Medical History:   Diagnosis Date    Coronary artery disease 11/2014    stent x 1 in Nov. 2014; followed by Dr. eWi    Hyperlipidemia     Hypertension     NSTEMI (non-ST elevated myocardial infarction) 11/07/2014       PSH:  Past Surgical History:   Procedure Laterality Date    CORONARY ANGIOPLASTY WITH STENT PLACEMENT  11/2014    dr. Wei    stent placed  11/07/2014    on placed.       FamHx:  Family History   Problem Relation Age of Onset    Heart disease Mother      CABG x 3 vessels - 55    Diabetes Mother     Stroke Mother     Heart disease Father      CABG x 4 - age 40    Cancer Brother 29     colon,liver     Colon cancer Brother 29    Cancer Brother      gastric passed age 43    Stomach cancer Brother 39    Celiac disease Neg Hx     Crohn's disease Neg Hx     Esophageal cancer Neg Hx     Inflammatory bowel disease Neg Hx     Irritable bowel syndrome  Neg Hx     Liver cancer Neg Hx     Rectal cancer Neg Hx     Ulcerative colitis Neg Hx        SocHx:  Social History     Social History    Marital status:      Spouse name: N/A    Number of children: N/A    Years of education: N/A     Occupational History    security at [employer]      Social History Main Topics    Smoking status: Current Every Day Smoker     Packs/day: 0.25     Years: 30.00     Types: Cigarettes    Smokeless tobacco: Never Used    Alcohol use No      Comment: pt states he quit September 2017    Drug use: No    Sexual activity: Not on file     Other Topics Concern    Not on file     Social History Narrative    No narrative on file       Objective:      Physical Exam   Constitutional: He is oriented to person, place, and time. He appears well-developed. No distress.   Cachextic   HENT:   Head: Normocephalic and atraumatic.   Mouth/Throat: Oropharynx is clear and moist. No oropharyngeal exudate.   Eyes: Conjunctivae and EOM are normal. Pupils are equal, round, and reactive to light. Right eye exhibits no discharge. Left eye exhibits no discharge. No scleral icterus.   Neck: Normal range of motion. Neck supple. No tracheal deviation present.   Cardiovascular: Normal rate, regular rhythm, normal heart sounds and intact distal pulses.  Exam reveals no gallop and no friction rub.    No murmur heard.  Pulmonary/Chest: Effort normal and breath sounds normal. No respiratory distress. He has no wheezes. He has no rales.   Abdominal: Soft. Bowel sounds are normal. He exhibits no distension. There is tenderness. There is no rebound and no guarding.   Musculoskeletal: Normal range of motion. He exhibits no edema or tenderness.   Neurological: He is alert and oriented to person, place, and time.   Skin: Skin is warm and dry. No rash noted. He is not diaphoretic. No erythema.   Psychiatric: He has a normal mood and affect. His behavior is normal. Judgment and thought content normal.    Nursing note and vitals reviewed.      LABS:  WBC   Date Value Ref Range Status   03/19/2018 14.34 (H) 3.90 - 12.70 K/uL Final     Hemoglobin   Date Value Ref Range Status   03/19/2018 14.2 14.0 - 18.0 g/dL Final     Hematocrit   Date Value Ref Range Status   03/19/2018 41.6 40.0 - 54.0 % Final     Platelets   Date Value Ref Range Status   03/19/2018 371 (H) 150 - 350 K/uL Final       Chemistry        Component Value Date/Time     (L) 03/19/2018 1513    K 5.5 (H) 03/19/2018 1513    CL 91 (L) 03/19/2018 1513    CO2 25 03/19/2018 1513    BUN 53 (H) 03/19/2018 1513    CREATININE 1.2 03/19/2018 1513     03/19/2018 1513        Component Value Date/Time    CALCIUM 11.0 (H) 03/19/2018 1513    ALKPHOS 80 03/19/2018 1513    AST 16 03/19/2018 1513    ALT 10 03/19/2018 1513    BILITOT 0.5 03/19/2018 1513    ESTGFRAFRICA >60.0 03/19/2018 1513    EGFRNONAA >60.0 03/19/2018 1513            Assessment:       1. Non-small cell cancer of left lung    2. Metastatic cancer to intrathoracic lymph nodes    3. 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 Liver metastasis    5. Encounter for chemotherapy management    6. Neoplasm related pain (acute) (chronic)    7. Constipation, unspecified constipation type    8. Decreased appetite        Plan:       1,2,3,4,5- Metastatic NSCLC (Adenocarcinoma), no actionable mutations by serum NGS, pos PD-L1 status.       - Discussed incurable nature of disease and rationale for palliative systemic therapy - PD-L1 = 50% per NCCN guidelines will start Keytruda.      Patient was consented for immunotherapy today 3/27/2018 .   An extensive discussion was had which included a thorough discussion of the risk and benefits of treatment and alternatives.  Risks, including but not limited to, possible immune mediated side effects, hair loss, bone marrow damage (anemia, thrombocytopenia, immune suppression, neutropenia), damage to body organs (brain, heart, liver,  kidney, lungs, nervous system, skin, and others), allergic reactions, sterility, nausea/vomiting, constipation/diarrhea, sores in the mouth, secondary cancers, local damage at possible injection sites, and rarely death were all discussed.  The patient agrees with the plan, and all questions have been answered to their satisfaction.  Consent was signed the patient, provider, and a third party witness.      6- Refilled MS Contin 15mg BID, con't PRN percocet    7. Constipation:  - Passing gas.   - Likely secondary to opioids, con't current regimen     8- Appetite:  - Will try periactin    Refer to ENT for ear fullness, RTC 3 wks with labs, Keytruda, and to see me.      The patient agrees with the plan, and all questions have been answered to their satisfaction.      More than 40 mins were spent during this encounter, greater than 50% was spent in direct counseling and/or coordination of care.     Abilio Meadows M.D., M.S., F.A.C.P.  Hematology and Oncology Attending  [facility]

## 2018-03-27 NOTE — TELEPHONE ENCOUNTER
----- Message from Abilio Meadows MD sent at 3/27/2018  9:38 AM CDT -----  Refer to ENT for ear fullness, RTC 3 wks with labs, Keytruda, and to see me.

## 2018-03-27 NOTE — PLAN OF CARE
Problem: Chemotherapy Effects (Adult)  Goal: Signs and Symptoms of Listed Potential Problems Will be Absent, Minimized or Managed (Chemotherapy Effects)  Signs and symptoms of listed potential problems will be absent, minimized or managed by discharge/transition of care (reference Chemotherapy Effects (Adult) CPG).  Outcome: Ongoing (interventions implemented as appropriate)  Pt here for keytruda D1C1,labs, hx, meds, allergies reviewed, pt states starting to eat more , swallowing problems resolving, pt somewhat anxious daughter states he was out of his Xanax but Dr. peterson sent in for a refill, reclined in chair, continue to monitor

## 2018-03-27 NOTE — Clinical Note
Refer to ENT for ear fullness.  RTC 3 wks with labs from [facility] (CBC,CMP), Keytruda, and to see Dr. Meadows.

## 2018-03-27 NOTE — PLAN OF CARE
Problem: Patient Care Overview  Goal: Plan of Care Review  Outcome: Ongoing (interventions implemented as appropriate)  Pt tolerated keytruda and 1 liter NS without issue, pt has no upcoming appts scheduled at this time, pt and daughter aware, pt d/c with daughter via wheelchair

## 2018-03-27 NOTE — PROGRESS NOTES
The patient, a family member, and a caregiver were approached by me in [facility] regarding participation in [employer] iSpecimen ([id number]) study (IRB#[id number].C). They were agreeable.    The Informed Consent Form (ICF) was reviewed with pt and his family member/caregiver. The discussion included:    - participation is voluntary and will not prohibit the patient from participating in future research studies;  - the blood specimen (2 tubes) will be collected at time of patient's next routine blood draw;   - pt can change his mind about participating at any time;  - if he changes his mind about participation, he can call us at contact info in the ICF, and we will discard samples remaining;  - samples that have been used prior to his notification will still be included in research;  - specimens will be stored with unique code that can only be linked to pt by [employer] staff;  - all medical information released to researchers will be stripped of identifiers;  - samples will not be released to outside researchers unless approved by internal committee;  - there is a small risk of loss of confidentiality, but we make every effort to ensure privacy;  - no other physical risks outside of those involved in standard of care procedure.    Dr. Meadows approved of the patient's participation and will continue to take care of the patient per his usual protocol - participation in [employer] program will not change the patient's present or future medical care. Pt did not have any questions. He and his caregiver did mention that it is difficult for phlebotomy to access his veins for blood draws. They asked if the research blood could be taken from his port. I said yes and that his clinical labs could also be drawn from the port if they let the clinical team know. I told them that, if they wanted, future labs could be drawn on 5th floor in Infusion via the port, then patient could come see Dr. Meadows, then go back upstairs for his  Infusion via same port access as prior. They said they understood and seemed interested.      Pt willingly and independently signed the ICF. A copy of the signed ICF and my business card were given to pt with instructions to call with any questions that may arise or if he should change his mind regarding participation in [employer] program.    After the consent process, I let Dr. Meadows and Seda know about patient's desire to potentially get labs drawn in Infusion in the future.

## 2018-03-28 NOTE — TELEPHONE ENCOUNTER
----- Message from Beau Pressley sent at 3/28/2018  9:18 AM CDT -----  Contact: Pharmacy   Will like office to either call or fax over Rx for ALPRAZolam (XANAX) 0.5 MG tablet    Contact::[phone]  Fax:[phone]

## 2018-04-09 NOTE — TELEPHONE ENCOUNTER
Spoke to patient's daughter, she states patient's left foot is swollen and he states it is cold. This began this morning. Patient is still not eating, she is concerned about his worsening condition. Let her know that we were ordering an ultrasound of his left foot to check for a blood clot. She states her father has not been moving around much since the weekend. Let her know if symptoms worsen he needs to go to the emergency room immediately. She verbalizes an understanding, let her know I would discuss everything with Seda and Dr. Meadows and we will keep an eye out for results of the ultrasound tomorrow.

## 2018-04-09 NOTE — TELEPHONE ENCOUNTER
----- Message from Jeanie Calles sent at 4/9/2018  2:42 PM CDT -----  Contact: pt poli marroquin  Pt daughter called and states that pt left foot is swelling up bad and would like to speak with a nurse  Callback#[phone]  Thank You  UGO Calles

## 2018-04-13 NOTE — TELEPHONE ENCOUNTER
----- Message from Jeanie Calles sent at 4/13/2018  2:48 PM CDT -----  Contact: Pt daughter Liz  Pt daughter called to speak with a nurse about pt daughter  states that he needs some medication for his Tail Bone and needs a call ASAP  Callback#[phone]  Thank You  UGO Calles

## 2018-04-13 NOTE — TELEPHONE ENCOUNTER
"Spoke with patient's daughter. She notes patient has lost a tremendous amount of weight as of lately, as he is "only skin and bones. He has not walked since Monday of this week. He can't anymore." daughter is calling to see what the MD requests she place on his sacrum/ "tail bone", as he has developed about a 2in circumference broken area of skin. She notes the patient can not move freely anymore at all---so he spends all his time sitting/lying. Nurse expressed to daughter he needs to turn/re-position at least every 2 hours, as we don't want this to become worse. Daughter states, "  What should I do now. Should I put antibiotic cream and gauze? Should I keep it dry?"    Nurse informed patient she would contact her back after speaking with dr valdivia, as dr peterson/cristin are out of clinic today.     Message routed to dr valdivia (what is your preferred barrier cream for stage 1 decub ulcer?)  "

## 2018-04-15 PROBLEM — R41.82 ALTERED MENTAL STATUS: Status: ACTIVE | Noted: 2018-01-01

## 2018-04-15 PROBLEM — L89.159 DECUBITUS ULCER OF SACRAL REGION: Status: ACTIVE | Noted: 2018-01-01

## 2018-04-15 PROBLEM — A41.9 SEPSIS: Status: ACTIVE | Noted: 2018-01-01

## 2018-04-15 PROBLEM — E16.2 HYPOGLYCEMIA: Status: ACTIVE | Noted: 2018-01-01

## 2018-04-15 PROBLEM — C34.90 METASTATIC LUNG CANCER (METASTASIS FROM LUNG TO OTHER SITE): Status: ACTIVE | Noted: 2018-01-01

## 2018-04-15 NOTE — ASSESSMENT & PLAN NOTE
Patient presented with hypoglycemia and hypothermia. His WBC ~14 K but it has been in this range for weeks. He is not tachycardic. His BP is on the lower side.   - Source could be skin vs UTI.   - Blood cultures are sent, will follow results.   - CXR with no clear consolidation or effusion.   - will obtain urine cultures.   - he received one dose of Zosyn in the ED. Will switch to cefepime 2 g every 12 hrs.

## 2018-04-15 NOTE — ASSESSMENT & PLAN NOTE
Patient presented with hypoglycemia and hypothermia. His WBC ~14 K but it has been in this range for weeks. He is not tachycardic. His BP is on the lower side.   - Source could be skin.   - Blood cultures are sent, will follow results.   - CXR with no clear consolidation or effusion.   - UA is clear.   - he received one dose of Zosyn in the ED. Will switch to cefepime 2 g every 12 hrs.   - 4/16: His temp is back to normal. His cultures are NGT.     Plan:   - Continue cefepime.

## 2018-04-15 NOTE — ED TRIAGE NOTES
Pt arrived to ED via [facility] with CC of decreased LOC, EMS reports pt CBG 21 on scene, increased to 176 after 1 amp of D50. CBG on arrival 112. Denies any complaints at this time. Denies CP SOB fever or cough. Denies any pain at this time.

## 2018-04-15 NOTE — HOSPITAL COURSE
In the ED he received one dose of Zosyn and D5 NS fluids. He is admitted to the medical oncology service for symptomatic hypoglycemia and hypothermia. Will continue his fluids and switch to Cefepime.   2018 patient was switched to D10 over night then switched back to D5 drip. His BS continues to be on the lower side.   Over the past 3 days his BS readings were ranging in the 60-80s on the D5 drip. Dr. Gaines discussed end of life situation and comfort care measures with the patient and his wife at bedside. They agreed on DNR and comfort care measures once his family is around. Will continue D5 1/2NS at 100 cc/hr for now.  patient family was waiting for patient's brother to come before stopping the D5W drip. The drip was discontinued on , and the patient  early morning of 2018 at 03:26.

## 2018-04-15 NOTE — ED NOTES
Hourly rounding complete. Patient resting in stretcher and is in NAD at this time. Pt is awake alert and oriented x4, respirations even and unlabored. Pt denies pain at this time. Pt updated on POC. Family at bedside. Pt remains on bear hugger. Pt provided urinal and instructed on need for urine sample, states unable to provide, instructed to press call bell as soon as able to provide. Bed low and locked with side rails up x2, call bell in pt reach. Pt voices no needs at this time.

## 2018-04-15 NOTE — ED PROVIDER NOTES
Encounter Date: 4/15/2018       History     Chief Complaint   Patient presents with    Altered Mental Status     AMS onset this morning, on scene BS was 21, 1 amp of D50 BS after 176, pt is oriented x3 at this time.     This is a 52-year-old male with a history of lung cancer with metastasis to kidney and liver who presents with altered mental status. Up to yesterday, the patient was at his baseline, but this morning family noted that he was awake but not talking.  Family called EMS and on arrival, CBG was 21.  The patient was given 1 amp of D50 and spontaneously returned to his baseline and was conversant.  He denies slurred speech, numbness, weakness, chest pain, trouble breathing, nausea, vomiting, urinary or bowel complaints.  He denies being a diabetic, denies any use of anti-hyperglycemic, but endorses poor appetite with diminished by mouth intake the last few days.          Review of patient's allergies indicates:   Allergen Reactions    Motrin [ibuprofen] Swelling     Past Medical History:   Diagnosis Date    Cancer     lung    Coronary artery disease 11/2014    stent x 1 in Nov. 2014; followed by Dr. Wei    Hyperlipidemia     Hypertension     NSTEMI (non-ST elevated myocardial infarction) 11/07/2014     Past Surgical History:   Procedure Laterality Date    CORONARY ANGIOPLASTY WITH STENT PLACEMENT  11/2014    dr. Wei    PORTACATH PLACEMENT Right     stent placed  11/07/2014    on placed.     Family History   Problem Relation Age of Onset    Heart disease Mother      CABG x 3 vessels - 55    Diabetes Mother     Stroke Mother     Heart disease Father      CABG x 4 - age 40    Cancer Brother 29     colon,liver     Colon cancer Brother 29    Cancer Brother      gastric passed age 43    Stomach cancer Brother 39    Celiac disease Neg Hx     Crohn's disease Neg Hx     Esophageal cancer Neg Hx     Inflammatory bowel disease Neg Hx     Irritable bowel syndrome Neg Hx     Liver cancer Neg  Hx     Rectal cancer Neg Hx     Ulcerative colitis Neg Hx      Social History   Substance Use Topics    Smoking status: Current Every Day Smoker     Packs/day: 0.25     Years: 30.00     Types: Cigarettes    Smokeless tobacco: Never Used    Alcohol use No      Comment: pt states he quit September 2017     Review of Systems   Constitutional: Positive for activity change, appetite change, fatigue and unexpected weight change. Negative for fever.   HENT: Negative for sore throat.    Respiratory: Negative for shortness of breath.    Cardiovascular: Negative for chest pain.   Gastrointestinal: Negative for nausea and vomiting.   Genitourinary: Negative for dysuria.   Musculoskeletal: Negative for back pain.   Skin: Positive for wound (sacral decubitus ). Negative for rash.   Neurological: Positive for weakness (generalized ).   Hematological: Does not bruise/bleed easily.       Physical Exam     Initial Vitals [04/15/18 1054]   BP Pulse Resp Temp SpO2   114/79 78 18 -- 95 %      MAP       90.67         Physical Exam    Nursing note and vitals reviewed.  Constitutional: He is not diaphoretic. He appears cachectic. He is cooperative. He has a sickly appearance. No distress.       Grade 1 sacral decub with with mild erythema but no induration, fluctuance, or drainage.    HENT:   Head: Normocephalic and atraumatic.   Eyes: EOM are normal. Pupils are equal, round, and reactive to light.   Neck: Normal range of motion.   Cardiovascular: Normal rate, regular rhythm and normal heart sounds. Exam reveals no friction rub.    No murmur heard.  Pulmonary/Chest: Breath sounds normal. No respiratory distress. He has no wheezes. He has no rhonchi. He has no rales. He exhibits no tenderness.   Abdominal: Soft. Bowel sounds are normal. He exhibits no distension. There is no tenderness. There is no rebound and no guarding.   Musculoskeletal: Normal range of motion. He exhibits no edema or tenderness.   Lymphadenopathy:     He has no  cervical adenopathy.   Neurological: He is alert and oriented to person, place, and time. No cranial nerve deficit or sensory deficit.   Skin: No rash noted.         ED Course   Procedures  Labs Reviewed   POCT GLUCOSE - Abnormal; Notable for the following:        Result Value    POCT Glucose 112 (*)     All other components within normal limits   CULTURE, BLOOD   CULTURE, BLOOD   CBC W/ AUTO DIFFERENTIAL   COMPREHENSIVE METABOLIC PANEL   LACTIC ACID, PLASMA   URINALYSIS, REFLEX TO URINE CULTURE   POCT GLUCOSE MONITORING CONTINUOUS                   APC / Resident Notes:   This is a 52-year-old male with a history of lung cancer with metastasis who presents with appendicitis and hypoglycemia.    Differential diagnosis: Toxicology, hypoglycemia, sepsis, electrolyte abnormalities, dehydration, failure to thrive, less likely stroke/TIA, MI since the patient returned to his baseline after D50 and denies any focal neurological findings.    Workup: Imaging, EKG, labs, fluids, and reassess.    Rectal temperature 93.5; bear hugger is applied.  We will continue to monitor.    Paul Hopkins MD   Emergency Medicine PGY 3  11:10 PM 4/15/2018    1:31 PM  Chest x-ray with no new findings when compared to the previous x-ray demonstrated upper lung mass.  Labs with leukocytosis, elevated BUN, no anemia, normal creatinine, glucose elevated.  UA is still pending.  Patient started on banana bag, IV fluids, D5.  Hem/Onc is consulted for further care and inpatient. Will admit.    Paul Hopkins MD   Emergency Medicine PGY 3  3:04 PM 4/15/2018                     Clinical Impression:   The primary encounter diagnosis was Hypoglycemia. A diagnosis of Altered mental status was also pertinent to this visit.                           Paul Hopkins MD  Resident  04/15/18 4041

## 2018-04-15 NOTE — SUBJECTIVE & OBJECTIVE
Oncology Treatment Plan:   OP MELANOMA PEMBROLIZUMAB    Medications:  Continuous Infusions:   dextrose 5 % and 0.9 % NaCl       Scheduled Meds:   ceFEPime (MAXIPIME) IVPB  2 g Intravenous Q12H    morphine  15 mg Oral BID     PRN Meds:ALPRAZolam, dextrose 50%, dextrose 50%, glucagon (human recombinant), glucose, glucose, sodium chloride 0.9%     Review of patient's allergies indicates:   Allergen Reactions    Motrin [ibuprofen] Swelling        Past Medical History:   Diagnosis Date    Cancer     lung    Coronary artery disease 11/2014    stent x 1 in Nov. 2014; followed by Dr. Wei    Hyperlipidemia     Hypertension     NSTEMI (non-ST elevated myocardial infarction) 11/07/2014     Past Surgical History:   Procedure Laterality Date    CORONARY ANGIOPLASTY WITH STENT PLACEMENT  11/2014    dr. Wei    PORTACATH PLACEMENT Right     stent placed  11/07/2014    on placed.     Family History     Problem Relation (Age of Onset)    Cancer Brother (29), Brother    Colon cancer Brother (29)    Diabetes Mother    Heart disease Mother, Father    Stomach cancer Brother (39)    Stroke Mother        Social History Main Topics    Smoking status: Current Every Day Smoker     Packs/day: 0.25     Years: 30.00     Types: Cigarettes    Smokeless tobacco: Never Used    Alcohol use No      Comment: pt states he quit September 2017    Drug use: No    Sexual activity: Not on file       Review of Systems   Constitutional: Positive for activity change, appetite change and fatigue. Negative for chills and fever.   Respiratory: Negative for cough and shortness of breath.    Cardiovascular: Negative for chest pain and leg swelling.   Gastrointestinal: Positive for diarrhea. Negative for abdominal distention, abdominal pain, blood in stool, nausea and vomiting.   Genitourinary: Negative for dysuria and hematuria.   Musculoskeletal: Positive for back pain.   Skin: Positive for wound.   Allergic/Immunologic: Positive for  immunocompromised state.   Psychiatric/Behavioral: Positive for confusion.     Objective:     Vital Signs (Most Recent):  Temp: (!) 93.5 °F (34.2 °C) (04/15/18 1148)  Pulse: 67 (04/15/18 1230)  Resp: 18 (04/15/18 1230)  BP: 103/70 (04/15/18 1230)  SpO2: 97 % (04/15/18 1230) Vital Signs (24h Range):  Temp:  [93.5 °F (34.2 °C)] 93.5 °F (34.2 °C)  Pulse:  [64-78] 67  Resp:  [18] 18  SpO2:  [94 %-97 %] 97 %  BP: (103-115)/(70-80) 103/70     Weight: 39.9 kg (88 lb)  Body mass index is 13.38 kg/m².  Body surface area is 1.38 meters squared.      Intake/Output Summary (Last 24 hours) at 04/15/18 1445  Last data filed at 04/15/18 1412   Gross per 24 hour   Intake              500 ml   Output                0 ml   Net              500 ml       Physical Exam   Constitutional: He is oriented to person, place, and time. He appears cachectic. No distress.   HENT:   Head: Normocephalic and atraumatic.   Eyes: EOM are normal.   Neck: Neck supple. No JVD present.   Cardiovascular: Normal rate, regular rhythm, normal heart sounds and intact distal pulses.    No murmur heard.  He has a port on his anterior chest wall.      Pulmonary/Chest: No respiratory distress. He has no wheezes.   Decreased breath sounds bilaterally.      Abdominal: Soft. He exhibits no distension. There is no tenderness.   Musculoskeletal: He exhibits no edema.   Neurological: He is alert and oriented to person, place, and time.   Skin: Skin is warm. He is not diaphoretic.   Psychiatric: He has a normal mood and affect.   Vitals reviewed.      Significant Labs:   CBC:   Recent Labs  Lab 04/15/18  1123   WBC 14.84*   HGB 13.7*   HCT 39.1*   *    and CMP:   Recent Labs  Lab 04/15/18  1123      K 3.9      CO2 19*   *   BUN 80*   CREATININE 0.8   CALCIUM 7.9*   PROT 5.4*   ALBUMIN 2.5*   BILITOT 0.8   ALKPHOS 146*   AST 78*   ALT 88*   ANIONGAP 8   EGFRNONAA >60.0

## 2018-04-15 NOTE — ED PROVIDER NOTES
Encounter Date: 4/15/2018       History     Chief Complaint   Patient presents with    Altered Mental Status     AMS onset this morning, on scene BS was 21, 1 amp of D50 BS after 176, pt is oriented x3 at this time.     HPI  Review of patient's allergies indicates:   Allergen Reactions    Motrin [ibuprofen] Swelling     Past Medical History:   Diagnosis Date    Cancer     lung    Coronary artery disease 11/2014    stent x 1 in Nov. 2014; followed by Dr. Wei    Hyperlipidemia     Hypertension     NSTEMI (non-ST elevated myocardial infarction) 11/07/2014     Past Surgical History:   Procedure Laterality Date    CORONARY ANGIOPLASTY WITH STENT PLACEMENT  11/2014    dr. Wei    PORTACATH PLACEMENT Right     stent placed  11/07/2014    on placed.     Family History   Problem Relation Age of Onset    Heart disease Mother      CABG x 3 vessels - 55    Diabetes Mother     Stroke Mother     Heart disease Father      CABG x 4 - age 40    Cancer Brother 29     colon,liver     Colon cancer Brother 29    Cancer Brother      gastric passed age 43    Stomach cancer Brother 39    Celiac disease Neg Hx     Crohn's disease Neg Hx     Esophageal cancer Neg Hx     Inflammatory bowel disease Neg Hx     Irritable bowel syndrome Neg Hx     Liver cancer Neg Hx     Rectal cancer Neg Hx     Ulcerative colitis Neg Hx      Social History   Substance Use Topics    Smoking status: Current Every Day Smoker     Packs/day: 0.25     Years: 30.00     Types: Cigarettes    Smokeless tobacco: Never Used    Alcohol use No      Comment: pt states he quit September 2017     Review of Systems    Physical Exam     Initial Vitals   BP Pulse Resp Temp SpO2   04/15/18 1054 04/15/18 1054 04/15/18 1054 04/15/18 1148 04/15/18 1054   114/79 78 18 (!) 93.5 °F (34.2 °C) 95 %      MAP       04/15/18 1054       90.67         Physical Exam    ED Course   Critical Care  Date/Time: 4/15/2018 3:15 PM  Performed by: MIGUEL COLUNGA  SUSHMA  Authorized by: MIGUEL COLUNGA   Direct patient critical care time: 15 minutes  Additional history critical care time: 5 minutes  Ordering / reviewing critical care time: 5 minutes  Documentation critical care time: 5 minutes  Total critical care time (exclusive of procedural time) : 30 minutes  Critical care time was exclusive of separately billable procedures and treating other patients and teaching time.  Critical care was necessary to treat or prevent imminent or life-threatening deterioration of the following conditions: metabolic crisis and endocrine crisis.  Critical care was time spent personally by me on the following activities: evaluation of patient's response to treatment, obtaining history from patient or surrogate, ordering and review of laboratory studies, review of old charts, examination of patient, ordering and performing treatments and interventions, ordering and review of radiographic studies and re-evaluation of patient's condition.        Labs Reviewed   CBC W/ AUTO DIFFERENTIAL - Abnormal; Notable for the following:        Result Value    WBC 14.84 (*)     RBC 4.42 (*)     Hemoglobin 13.7 (*)     Hematocrit 39.1 (*)     RDW 18.8 (*)     Platelets 113 (*)     Immature Granulocytes 1.8 (*)     Gran # (ANC) 13.1 (*)     Immature Grans (Abs) 0.27 (*)     Lymph # 0.9 (*)     Gran% 88.0 (*)     Lymph% 6.3 (*)     Mono% 3.2 (*)     All other components within normal limits   COMPREHENSIVE METABOLIC PANEL - Abnormal; Notable for the following:     CO2 19 (*)     Glucose 131 (*)     BUN, Bld 80 (*)     Calcium 7.9 (*)     Total Protein 5.4 (*)     Albumin 2.5 (*)     Alkaline Phosphatase 146 (*)     AST 78 (*)     ALT 88 (*)     All other components within normal limits   PROTIME-INR - Abnormal; Notable for the following:     Prothrombin Time 14.7 (*)     INR 1.5 (*)     All other components within normal limits    Narrative:     add on [id number] per Dr. Hopkins  04/15/2018  12:38    POCT  GLUCOSE - Abnormal; Notable for the following:     POCT Glucose 112 (*)     All other components within normal limits   CULTURE, BLOOD   CULTURE, BLOOD   CLOSTRIDIUM DIFFICILE   LACTIC ACID, PLASMA   PROTIME-INR   CORTISOL, FREE, SERUM   TSH   HEMOGLOBIN A1C   URINALYSIS, REFLEX TO URINE CULTURE   POCT GLUCOSE MONITORING CONTINUOUS             Medical Decision Making:   ED Management:  Concerning hx of hypoglycemia with absence of dm and hypotensive hx. Family states he ate broth and had been drinking some chocolate milk. Worried about hepatic synthetic function with hx of mets. Could just be carbohydrate starvation and progressive cancer that caused hypoglycemia. Warrants admission for critically low sugar. Pt's ms was intact at Er. Banana Bag admin due to nutrient starvation as a consideration also.                       Clinical Impression:   The primary encounter diagnosis was Hypoglycemia. A diagnosis of Altered mental status was also pertinent to this visit.                           Michael Mcnamara MD  04/15/18 7118

## 2018-04-15 NOTE — HPI
"52 year old male with a history of lung cancer with metastasis to kidney and liver on Kytruda. He presented to the ED via EMS with AMS.   Over the past week he became more lethargic and weak. He became bedridden. He used to walk around with a walker. He reports decreased PO intake. On the morning of admission he was unresponsive according to his sister. He was opening his eyes and starring. She called EMS. On their arrival his BS was 21.  The patient was given 1 amp of D50 and spontaneously returned to his baseline and was conversant. He has a small bedsore on his tailbone area about a quarter in size. Her also reports large amount of foul smelling BM over the past few days.    He denies fever, chills, cough, nausea, vomiting or dysuria.   He got his 1st chemotherapy 3 weeks ago.     Oncologic history per last clinic note:   52 year old male to clinic for follow-up after recent biopsy with Dr. Nicholson. Pt referred from GI after evaluation for abd pain revealed what appeared to be metastatic disease throughout the abdomen, including adrenal, renal mass, LNs, and questionable liver met.       Bx c/w NSCLC - [facility] - [id number] showed no actionable mutations.  Awaiting PD-L1 status.       Notes 30 lb wt loss over the last few months, including 10 lbs in the last week or so.  Notes abdominal pain, constipation, nausea, etc.       PS = 1-2      2/23/18- CT Abdomen/Pelvis "Large bilateral adrenal masses with mesenteric lymphadenopathy highly concerning for metastatic disease. Bilateral hypodense renal lesions which appear to enhance highly concerning for neoplasm such as renal cell carcinoma. Consider further evaluation with CT of the abdomen with and without contrast renal mass protocol.    2 small ill-defined hepatic hypodensities which are not cystic and do not have the enhancing pattern of a hemangioma highly concerning for metastatic disease."     2/26/18- CT Chest " Spiculated left upper lobe mass measuring up to 6.7 " "cm concerning for primary malignancy.Supraclavicular and mediastinal lymphadenopathy suggestive of metastases.Bilateral adrenal masses and ill-defined hypodense hepatic and renal lesions are suggestive of metastatic disease, better visualized on recent CT abdomen pelvis of the 2/23/2018."  "

## 2018-04-15 NOTE — ED NOTES
Pt identifiers Teddy Caballero were checked and correct  LOC: The patient is awake, alert, aware of environment with an appropriate affect. Oriented x3, speaking appropriately  APPEARANCE: Pt resting comfortably, in no acute distress, pt is clean and well groomed, clothing properly fastened  SKIN: Skin warm, dry and intact, normal skin turgor, dry mucus membranes, 2 stage II pressure ulcers noted to sacrum, covered with mepilex.   RESPIRATORY: Airway is open and patent, respirations are spontaneous, even and unlabored, normal effort and rate  CARDIAC: Normal rate and rhythm, no peripheral edema noted, capillary refill < 3 seconds, bilateral radial pulses 2+  ABDOMEN: Soft, non tender, non distended. Bowel sounds present x 4 quadrants.   NEUROLOGIC: PERRLA, facial expression is symmetrical, patient moving all extremities spontaneously, normal sensation in all extremities when touched with a finger.  Follows all commands appropriately  MUSCULOSKELETAL: No obvious deformities.

## 2018-04-15 NOTE — PROGRESS NOTES
Patient transferred from ED by RUSTY glover, unable to walk at this time. Wound noted to sacrum; old dressing removed. New dressing to be placed; wound care consulted. Patient turned off of wound with pillows. Float heels to be placed. Accuchecks continued, as ordered; patient hypoglycemic with BG 43 at 1740; 1/2 amp D50 given. When re-checked, blood sugar up to 88. Awaiting ernesto D.light Design[employer] machine for patient's comfort and previous hypothermia. No oxygen in place on transport; saturation 88% on room air at rest. 2L of humidified 02 initiated; saturation up to 92%. Telemetry discontinued by MD. Family at bedside and bed alarm in place. Complains of swallowing difficulty; Dr. Mills notified. Patient stable, will continue to monitor.

## 2018-04-15 NOTE — ED NOTES
NS stopped per MD verbal order with read back, will administer NS with MVI vitK thiamine folic acid as ordered when received in ED from pharmacy, message sent to pharmacy to send to ED.

## 2018-04-15 NOTE — ED NOTES
Hourly rounding complete. Patient resting in stretcher and is in NAD at this time. Pt is sleeping, easily aroused with verbal stimuli, respirations even and unlabored. Pt denies pain at this time. Family at bedside. Pt updated on POC. Bed low and locked with side rails up x2, call bell in pt reach. Pt voices no needs at this time.

## 2018-04-15 NOTE — ED NOTES
The patient is awake, alert and cooperative with a calm affect, patient is aware of environment. Airway is open and patent, respirations are spontaneous, normal respiratory effort and rate noted, skin warm and dry, moves all extremities well, appearance: no apparent distress noted. Side rails x 2. Call bell within reach. Will continue to monitor. Family at the bedside.

## 2018-04-15 NOTE — ASSESSMENT & PLAN NOTE
Patient BS was 20 on EMS arrival. He had full recovery after glucose administration. This could be related to his adrenal mass causing adrenal insufficiency.   - Last glucose POCT ~120 g/dl.     Plan:   - Continue D5 NS at 100 cc/hr.  - measure Cortisol level.   - Check TSH.   - POCT glucose every hour.

## 2018-04-15 NOTE — H&P
"[facility]  Hematology/Oncology  H&P    Patient Name: Teddy Caballero  MRN: [id number]  Admission Date: 4/15/2018  Code Status: Full Code   Attending Provider: Michael Mcnamara MD  Primary Care Physician: Iliana Campa NP  Principal Problem:Hypoglycemia    Subjective:     HPI: 52 year old male with a history of lung cancer with metastasis to kidney and liver on Kytruda. He presented to the ED via EMS with AMS.   Over the past week he became more lethargic and weak. He became bedridden. He used to walk around with a walker. He reports decreased PO intake. On the morning of admission he was unresponsive according to his sister. He was opening his eyes and starring. She called EMS. On their arrival his BS was 21.  The patient was given 1 amp of D50 and spontaneously returned to his baseline and was conversant. He has a small bedsore on his tailbone area about a quarter in size. Her also reports large amount of foul smelling BM over the past few days.    He denies fever, chills, cough, nausea, vomiting or dysuria.   He got his 1st chemotherapy 3 weeks ago.     Oncologic history per last clinic note:   52 year old male to clinic for follow-up after recent biopsy with Dr. Nicholson. Pt referred from GI after evaluation for abd pain revealed what appeared to be metastatic disease throughout the abdomen, including adrenal, renal mass, LNs, and questionable liver met.       Bx c/w NSCLC - Adeno - [id number] showed no actionable mutations.  Awaiting PD-L1 status.       Notes 30 lb wt loss over the last few months, including 10 lbs in the last week or so.  Notes abdominal pain, constipation, nausea, etc.       PS = 1-2      2/23/18- CT Abdomen/Pelvis "Large bilateral adrenal masses with mesenteric lymphadenopathy highly concerning for metastatic disease. Bilateral hypodense renal lesions which appear to enhance highly concerning for neoplasm such as renal cell carcinoma. Consider further evaluation with " "CT of the abdomen with and without contrast renal mass protocol.    2 small ill-defined hepatic hypodensities which are not cystic and do not have the enhancing pattern of a hemangioma highly concerning for metastatic disease."     2/26/18- CT Chest " Spiculated left upper lobe mass measuring up to 6.7 cm concerning for primary malignancy.Supraclavicular and mediastinal lymphadenopathy suggestive of metastases.Bilateral adrenal masses and ill-defined hypodense hepatic and renal lesions are suggestive of metastatic disease, better visualized on recent CT abdomen pelvis of the 2/23/2018."     Oncology Treatment Plan:   OP MELANOMA PEMBROLIZUMAB    Medications:  Continuous Infusions:   dextrose 5 % and 0.9 % NaCl       Scheduled Meds:   ceFEPime (MAXIPIME) IVPB  2 g Intravenous Q12H    morphine  15 mg Oral BID     PRN Meds:ALPRAZolam, dextrose 50%, dextrose 50%, glucagon (human recombinant), glucose, glucose, sodium chloride 0.9%     Review of patient's allergies indicates:   Allergen Reactions    Motrin [ibuprofen] Swelling        Past Medical History:   Diagnosis Date    Cancer     lung    Coronary artery disease 11/2014    stent x 1 in Nov. 2014; followed by Dr. Wei    Hyperlipidemia     Hypertension     NSTEMI (non-ST elevated myocardial infarction) 11/07/2014     Past Surgical History:   Procedure Laterality Date    CORONARY ANGIOPLASTY WITH STENT PLACEMENT  11/2014    dr. Wei    PORTACATH PLACEMENT Right     stent placed  11/07/2014    on placed.     Family History     Problem Relation (Age of Onset)    Cancer Brother (29), Brother    Colon cancer Brother (29)    Diabetes Mother    Heart disease Mother, Father    Stomach cancer Brother (39)    Stroke Mother        Social History Main Topics    Smoking status: Current Every Day Smoker     Packs/day: 0.25     Years: 30.00     Types: Cigarettes    Smokeless tobacco: Never Used    Alcohol use No      Comment: pt states he quit September 2017    " Drug use: No    Sexual activity: Not on file       Review of Systems   Constitutional: Positive for activity change, appetite change and fatigue. Negative for chills and fever.   Respiratory: Negative for cough and shortness of breath.    Cardiovascular: Negative for chest pain and leg swelling.   Gastrointestinal: Positive for diarrhea. Negative for abdominal distention, abdominal pain, blood in stool, nausea and vomiting.   Genitourinary: Negative for dysuria and hematuria.   Musculoskeletal: Positive for back pain.   Skin: Positive for wound.   Allergic/Immunologic: Positive for immunocompromised state.   Psychiatric/Behavioral: Positive for confusion.     Objective:     Vital Signs (Most Recent):  Temp: (!) 93.5 °F (34.2 °C) (04/15/18 1148)  Pulse: 67 (04/15/18 1230)  Resp: 18 (04/15/18 1230)  BP: 103/70 (04/15/18 1230)  SpO2: 97 % (04/15/18 1230) Vital Signs (24h Range):  Temp:  [93.5 °F (34.2 °C)] 93.5 °F (34.2 °C)  Pulse:  [64-78] 67  Resp:  [18] 18  SpO2:  [94 %-97 %] 97 %  BP: (103-115)/(70-80) 103/70     Weight: 39.9 kg (88 lb)  Body mass index is 13.38 kg/m².  Body surface area is 1.38 meters squared.      Intake/Output Summary (Last 24 hours) at 04/15/18 1445  Last data filed at 04/15/18 1412   Gross per 24 hour   Intake              500 ml   Output                0 ml   Net              500 ml       Physical Exam   Constitutional: He is oriented to person, place, and time. He appears cachectic. No distress.   HENT:   Head: Normocephalic and atraumatic.   Eyes: EOM are normal.   Neck: Neck supple. No JVD present.   Cardiovascular: Normal rate, regular rhythm, normal heart sounds and intact distal pulses.    No murmur heard.  He has a port on his anterior chest wall.      Pulmonary/Chest: No respiratory distress. He has no wheezes.   Decreased breath sounds bilaterally.      Abdominal: Soft. He exhibits no distension. There is no tenderness.   Musculoskeletal: He exhibits no edema.   Neurological: He is  alert and oriented to person, place, and time.   Skin: Skin is warm. He is not diaphoretic.   Psychiatric: He has a normal mood and affect.   Vitals reviewed.      Significant Labs:   CBC:   Recent Labs  Lab 04/15/18  1123   WBC 14.84*   HGB 13.7*   HCT 39.1*   *    and CMP:   Recent Labs  Lab 04/15/18  1123      K 3.9      CO2 19*   *   BUN 80*   CREATININE 0.8   CALCIUM 7.9*   PROT 5.4*   ALBUMIN 2.5*   BILITOT 0.8   ALKPHOS 146*   AST 78*   ALT 88*   ANIONGAP 8   EGFRNONAA >60.0         Assessment/Plan:     * Hypoglycemia    Patient BS was 20 on EMS arrival. He had full recovery after glucose administration. This could be related to his adrenal mass causing adrenal insufficiency.   - Last glucose POCT ~120 g/dl.     Plan:   - Continue D5 NS at 100 cc/hr.  - measure Cortisol level.   - Check TSH.   - POCT glucose every hour.          Sepsis    Patient presented with hypoglycemia and hypothermia. His WBC ~14 K but it has been in this range for weeks. He is not tachycardic. His BP is on the lower side.   - Source could be skin vs UTI.   - Blood cultures are sent, will follow results.   - CXR with no clear consolidation or effusion.   - will obtain urine cultures.   - he received one dose of Zosyn in the ED. Will switch to cefepime 2 g every 12 hrs.         Metastatic lung cancer (metastasis from lung to other site)    Patient with recent diagnosis of lung cancer. He started Keytruda therapy 3 weeks ago. He follows with Dr. Meadows.   - for more details see HPI.         Decubitus ulcer of sacral region    - small sacral bed sore.   - Wound care consulted.         Altered mental status    - see hypoglycemia.               Marcelo Mills MD  Hematology/Oncology  [facility]

## 2018-04-16 PROBLEM — E43 SEVERE MALNUTRITION: Status: ACTIVE | Noted: 2018-01-01

## 2018-04-16 PROBLEM — R53.1 WEAKNESS: Status: ACTIVE | Noted: 2018-01-01

## 2018-04-16 PROBLEM — L89.91 STAGE I PRESSURE ULCER: Status: ACTIVE | Noted: 2018-01-01

## 2018-04-16 PROBLEM — L89.150 UNSTAGEABLE PRESSURE ULCER OF SACRAL REGION: Status: ACTIVE | Noted: 2018-01-01

## 2018-04-16 NOTE — SUBJECTIVE & OBJECTIVE
Interval History: patient is alert and oriented. He is not able to eat pureed food.     Oncology Treatment Plan:   OP MELANOMA PEMBROLIZUMAB    Medications:  Continuous Infusions:   dextrose 5 % and 0.45 % NaCl 100 mL/hr at 04/16/18 1027     Scheduled Meds:   ceFEPime (MAXIPIME) IVPB  2 g Intravenous Q12H    enoxaparin  40 mg Subcutaneous Daily    morphine  15 mg Oral BID     PRN Meds:ALPRAZolam, dextrose 50%, dextrose 50%, glucagon (human recombinant), glucose, glucose, oxyCODONE-acetaminophen, sodium chloride 0.9%     Review of Systems   Constitutional: Positive for activity change, appetite change and fatigue. Negative for chills and fever.   HENT: Positive for trouble swallowing.    Respiratory: Negative for cough and shortness of breath.    Cardiovascular: Negative for chest pain and leg swelling.   Gastrointestinal: Positive for diarrhea. Negative for abdominal distention, abdominal pain, blood in stool, nausea and vomiting.   Genitourinary: Negative for dysuria and hematuria.   Musculoskeletal: Positive for back pain.   Skin: Positive for wound.   Allergic/Immunologic: Positive for immunocompromised state.   Neurological: Positive for weakness.   Psychiatric/Behavioral: Positive for confusion.     Objective:     Vital Signs (Most Recent):  Temp: 98 °F (36.7 °C) (04/16/18 0841)  Pulse: 91 (04/16/18 0841)  Resp: 18 (04/16/18 0841)  BP: 92/65 (04/16/18 0841)  SpO2: 95 % (04/16/18 0841) Vital Signs (24h Range):  Temp:  [93.5 °F (34.2 °C)-98.5 °F (36.9 °C)] 98 °F (36.7 °C)  Pulse:  [67-96] 91  Resp:  [15-23] 18  SpO2:  [88 %-97 %] 95 %  BP: ()/(61-80) 92/65     Weight: 39.6 kg (87 lb 4.8 oz)  Body mass index is 12.89 kg/m².  Body surface area is 1.39 meters squared.      Intake/Output Summary (Last 24 hours) at 04/16/18 1128  Last data filed at 04/16/18 1027   Gross per 24 hour   Intake             1560 ml   Output              225 ml   Net             1335 ml       Physical Exam   Constitutional: He is  oriented to person, place, and time. He appears cachectic. No distress.   HENT:   Head: Normocephalic and atraumatic.   Eyes: EOM are normal.   Neck: Neck supple. No JVD present.   Cardiovascular: Normal rate, regular rhythm, normal heart sounds and intact distal pulses.    No murmur heard.  He has a port on his anterior chest wall.      Pulmonary/Chest: No respiratory distress. He has no wheezes.   Decreased breath sounds bilaterally.      Abdominal: Soft. He exhibits no distension. There is no tenderness.   Musculoskeletal: He exhibits no edema.   Neurological: He is alert and oriented to person, place, and time. A sensory deficit (feet numbness ) is present.   His motor power on his feet +2 maximum and it is worse on the left side with sever muscle atrophy.   Legs and thighs are +3.   Upper extremities +3.    Skin: Skin is warm. He is not diaphoretic.   He has skin breakdown on his tail bone.    Psychiatric: He has a normal mood and affect.   Vitals reviewed.      Significant Labs:   CBC:   Recent Labs  Lab 04/15/18  1123 04/16/18  0425   WBC 14.84* 14.11*   HGB 13.7* 11.9*   HCT 39.1* 33.2*   * 108*    and CMP:   Recent Labs  Lab 04/15/18  1123 04/16/18  0425    137   K 3.9 3.8    111*   CO2 19* 19*   * 88   BUN 80* 75*   CREATININE 0.8 0.7   CALCIUM 7.9* 7.5*   PROT 5.4* 4.8*   ALBUMIN 2.5* 2.3*   BILITOT 0.8 0.9   ALKPHOS 146* 121   AST 78* 45*   ALT 88* 76*   ANIONGAP 8 7*   EGFRNONAA >60.0 >60.0       Diagnostic Results:  MRI: brain/ no metastesis.

## 2018-04-16 NOTE — ASSESSMENT & PLAN NOTE
Patient BS was 20 on EMS arrival. He had full recovery after glucose administration. This could be related to his adrenal mass causing adrenal insufficiency.   - His TSH is low. T4 is normal.   - Last glucose POCT ~146 g/dl. His BS drops off the D5 drip.     Plan:   - Continue D5 1/2NS at 100 cc/hr.  - Cortisol level still pending.   - POCT glucose every hour.

## 2018-04-16 NOTE — ASSESSMENT & PLAN NOTE
He has generalized weakness related to his sever malnutrition state. His weakness is more prominent in the lower extremities.   - Will consider imaging his spine looking for mets.

## 2018-04-16 NOTE — PLAN OF CARE
Problem: Patient Care Overview  Goal: Plan of Care Review  Outcome: Ongoing (interventions implemented as appropriate)  Pt has general pain throughout body. Uses urinal / bedpan at bedside. POCT glucose checks every hour. Dextrose running at 100 ml / hr. Gave PRN Xanax. Vital signs stable.Wife at bedside. Sherron hugger on. Heels floated. Had MRI this shift. Frequents rounds made to assess pain and safety. Patient remained free from falls. Bed in lowest position. Side rails up X 2. Call light within reach. Will continue to monitor.

## 2018-04-16 NOTE — PLAN OF CARE
Problem: Patient Care Overview  Goal: Plan of Care Review  Outcome: Ongoing (interventions implemented as appropriate)  Pt AAOx4. IVF maintained at 75 ml/hour. IV cefepime administered as ordered. 2 unstageable decubitus ulcers on sacrum covered in mepilex; CDI. Pt refused weight shift assistance. Pt has remained free from injury this shift. Pt had no c/o nausea or pain this shift. Pt's family at bedside and involved in care. Bed in low locked position. Call light and personal belongings within reach. Side rails up x2. Nonskid socks in place. All questions and comments addressed at this time. Will continue to monitor.  Fall, Pressure ulcer, infection precautions continued.

## 2018-04-16 NOTE — PROGRESS NOTES
Consulted to see for sacral skin concerns  Has unstageable pressure ulcers to coccyx and sacral area . Will place on Santyl ointment to begin enzymatic debridement of areas.  Family reports redness to thoracic spine but pt refusing to turn for assessment .   Left ear with stage I red area from glasses. Padded with mepilex lite for pts comfort. Will place on SPR+ pump to convert bed to a Low air loss.     04/16/18 1100       Port A Cath Single Lumen 03/23/18 1208 right subclavian   Placement Date/Time: 03/23/18 1208   Present Prior to Hospital Arrival?: No  Hand Hygiene: Performed  Barrier Precautions: Performed  Skin Antisepsis: ChloraPrep  Location: right subclavian  /Lot Number: [id number]  Pressure Injectable ...   Dressing Change Due 04/22/18       Pressure Injury 04/15/18 1705 Left medial Sacral spine   Date First Assessed/Time First Assessed: 04/15/18 1705   Pressure Injury Present on Admission: yes  Side: Left  Orientation: medial  Location: Sacral spine  Is this injury device related?: No   Wound Image    Staging U   Dressing Appearance Clean;Dry;Intact   Drainage Amount Scant   Drainage Characteristics/Odor Serosanguineous;No odor   Appearance White;Yellow;Slough;Moist;Not granulating   Tissue loss description Full thickness   Yellow (%), Wound Tissue Color 100 %   Periwound Area Redness   Wound Edges Open   Wound Length (cm) 2  (0.5)   Wound Width (cm) 2  (0.5)   Depth (cm) 0.2  (0.2 adjacent to coccyx)   Care Cleansed with:;Sterile normal saline   Dressing Applied;Hydrofiber;Foam       Pressure Injury 04/16/18 1100 Left Ear Stage 1   Date First Assessed/Time First Assessed: 04/16/18 1100   Pressure Injury Present on Admission: no  Side: Left  Location: Ear  Is this injury device related?: (c)   Staging: Stage 1  Wound Length (cm): 0.5  Wound Width (cm): 1   Wound Image    Staging 1   Dressing Appearance Open to air;No dressing   Drainage Amount None   Appearance Red   Tissue loss  description Not applicable   Periwound Area Redness   Wound Length (cm) 0.5   Wound Width (cm) 1   Care Cleansed with:;Sterile normal saline   Dressing Applied;Foam  (mepilex lite)       Pressure Injury 04/16/18 1100 Thoracic spine Stage 1   Date First Assessed/Time First Assessed: 04/16/18 1100   Pressure Injury Present on Admission: no  Location: Thoracic spine  Is this injury device related?: No  Staging: Stage 1   Wound Image (refused to turn and allow photo)   Staging 1   Dressing Appearance Open to air;No dressing   Drainage Amount None   Appearance Red   Periwound Area Redness     Discussed plan of care with Dr [place]    Recommendations:   1. Low air loss bed  2. Frequent turning  3. Already has a nutritional consult  4. Mepilex lite to left ear   5. Begin santyl ointment to coccyx ulcerations daily    Shashi Herrera RN CWON  [id number]

## 2018-04-16 NOTE — PROGRESS NOTES
"[facility]  Hematology/Oncology  Progress Note    Patient Name: Teddy Caballero  Admission Date: 4/15/2018  Hospital Length of Stay: 0 days  Code Status: Full Code     Subjective:     HPI:  52 year old male with a history of lung cancer with metastasis to kidney and liver on Kytruda. He presented to the ED via EMS with AMS.   Over the past week he became more lethargic and weak. He became bedridden. He used to walk around with a walker. He reports decreased PO intake. On the morning of admission he was unresponsive according to his sister. He was opening his eyes and starring. She called EMS. On their arrival his BS was 21.  The patient was given 1 amp of D50 and spontaneously returned to his baseline and was conversant. He has a small bedsore on his tailbone area about a quarter in size. Her also reports large amount of foul smelling BM over the past few days.    He denies fever, chills, cough, nausea, vomiting or dysuria.   He got his 1st chemotherapy 3 weeks ago.     Oncologic history per last clinic note:   52 year old male to clinic for follow-up after recent biopsy with Dr. Nicholson. Pt referred from GI after evaluation for abd pain revealed what appeared to be metastatic disease throughout the abdomen, including adrenal, renal mass, LNs, and questionable liver met.       Bx c/w NSCLC - Adeno - [id number] showed no actionable mutations.  Awaiting PD-L1 status.       Notes 30 lb wt loss over the last few months, including 10 lbs in the last week or so.  Notes abdominal pain, constipation, nausea, etc.       PS = 1-2      2/23/18- CT Abdomen/Pelvis "Large bilateral adrenal masses with mesenteric lymphadenopathy highly concerning for metastatic disease. Bilateral hypodense renal lesions which appear to enhance highly concerning for neoplasm such as renal cell carcinoma. Consider further evaluation with CT of the abdomen with and without contrast renal mass protocol.    2 small ill-defined " "hepatic hypodensities which are not cystic and do not have the enhancing pattern of a hemangioma highly concerning for metastatic disease."     2/26/18- CT Chest " Spiculated left upper lobe mass measuring up to 6.7 cm concerning for primary malignancy.Supraclavicular and mediastinal lymphadenopathy suggestive of metastases.Bilateral adrenal masses and ill-defined hypodense hepatic and renal lesions are suggestive of metastatic disease, better visualized on recent CT abdomen pelvis of the 2/23/2018."    Interval History: patient is alert and oriented. He is not able to eat pureed food.     Oncology Treatment Plan:   OP MELANOMA PEMBROLIZUMAB    Medications:  Continuous Infusions:   dextrose 5 % and 0.45 % NaCl 100 mL/hr at 04/16/18 1027     Scheduled Meds:   ceFEPime (MAXIPIME) IVPB  2 g Intravenous Q12H    enoxaparin  40 mg Subcutaneous Daily    morphine  15 mg Oral BID     PRN Meds:ALPRAZolam, dextrose 50%, dextrose 50%, glucagon (human recombinant), glucose, glucose, oxyCODONE-acetaminophen, sodium chloride 0.9%     Review of Systems   Constitutional: Positive for activity change, appetite change and fatigue. Negative for chills and fever.   HENT: Positive for trouble swallowing.    Respiratory: Negative for cough and shortness of breath.    Cardiovascular: Negative for chest pain and leg swelling.   Gastrointestinal: Positive for diarrhea. Negative for abdominal distention, abdominal pain, blood in stool, nausea and vomiting.   Genitourinary: Negative for dysuria and hematuria.   Musculoskeletal: Positive for back pain.   Skin: Positive for wound.   Allergic/Immunologic: Positive for immunocompromised state.   Neurological: Positive for weakness.   Psychiatric/Behavioral: Positive for confusion.     Objective:     Vital Signs (Most Recent):  Temp: 98 °F (36.7 °C) (04/16/18 0841)  Pulse: 91 (04/16/18 0841)  Resp: 18 (04/16/18 0841)  BP: 92/65 (04/16/18 0841)  SpO2: 95 % (04/16/18 0841) Vital Signs (24h " Range):  Temp:  [93.5 °F (34.2 °C)-98.5 °F (36.9 °C)] 98 °F (36.7 °C)  Pulse:  [67-96] 91  Resp:  [15-23] 18  SpO2:  [88 %-97 %] 95 %  BP: ()/(61-80) 92/65     Weight: 39.6 kg (87 lb 4.8 oz)  Body mass index is 12.89 kg/m².  Body surface area is 1.39 meters squared.      Intake/Output Summary (Last 24 hours) at 04/16/18 1128  Last data filed at 04/16/18 1027   Gross per 24 hour   Intake             1560 ml   Output              225 ml   Net             1335 ml       Physical Exam   Constitutional: He is oriented to person, place, and time. He appears cachectic. No distress.   HENT:   Head: Normocephalic and atraumatic.   Eyes: EOM are normal.   Neck: Neck supple. No JVD present.   Cardiovascular: Normal rate, regular rhythm, normal heart sounds and intact distal pulses.    No murmur heard.  He has a port on his anterior chest wall.      Pulmonary/Chest: No respiratory distress. He has no wheezes.   Decreased breath sounds bilaterally.      Abdominal: Soft. He exhibits no distension. There is no tenderness.   Musculoskeletal: He exhibits no edema.   Neurological: He is alert and oriented to person, place, and time. A sensory deficit (feet numbness ) is present.   His motor power on his feet +2 maximum and it is worse on the left side with sever muscle atrophy.   Legs and thighs are +3.   Upper extremities +3.    Skin: Skin is warm. He is not diaphoretic.   He has skin breakdown on his tail bone.    Psychiatric: He has a normal mood and affect.   Vitals reviewed.      Significant Labs:   CBC:   Recent Labs  Lab 04/15/18  1123 04/16/18  0425   WBC 14.84* 14.11*   HGB 13.7* 11.9*   HCT 39.1* 33.2*   * 108*    and CMP:   Recent Labs  Lab 04/15/18  1123 04/16/18  0425    137   K 3.9 3.8    111*   CO2 19* 19*   * 88   BUN 80* 75*   CREATININE 0.8 0.7   CALCIUM 7.9* 7.5*   PROT 5.4* 4.8*   ALBUMIN 2.5* 2.3*   BILITOT 0.8 0.9   ALKPHOS 146* 121   AST 78* 45*   ALT 88* 76*   ANIONGAP 8 7*    EGFRNONAA >60.0 >60.0       Diagnostic Results:  MRI: brain/ no metastesis.     Assessment/Plan:     * Hypoglycemia    Patient BS was 20 on EMS arrival. He had full recovery after glucose administration. This could be related to his adrenal mass causing adrenal insufficiency.   - His TSH is low. T4 is normal.   - Last glucose POCT ~146 g/dl. His BS drops off the D5 drip.     Plan:   - Continue D5 1/2NS at 100 cc/hr.  - Cortisol level still pending.   - POCT glucose every hour.          Sepsis    Patient presented with hypoglycemia and hypothermia. His WBC ~14 K but it has been in this range for weeks. He is not tachycardic. His BP is on the lower side.   - Source could be skin.   - Blood cultures are sent, will follow results.   - CXR with no clear consolidation or effusion.   - UA is clear.   - he received one dose of Zosyn in the ED. Will switch to cefepime 2 g every 12 hrs.   - 4/16: His temp is back to normal. His cultures are NGT.     Plan:   - Continue cefepime.         Weakness    He has generalized weakness related to his sever malnutrition state. His weakness is more prominent in the lower extremities.   - Will consider imaging his spine looking for mets.         Metastatic lung cancer (metastasis from lung to other site)    Patient with recent diagnosis of lung cancer. He started Keytruda therapy 3 weeks ago. He follows with Dr. Meadows.   - for more details see HPI.         Severe malnutrition    He has trouble swallowing anything but liquids.   - Nutrition is consulted.         Decubitus ulcer of sacral region    - small sacral bed sore.   - Wound care consulted. They saw him today.         Altered mental status    - see hypoglycemia.               Marcelo Mills MD  Hematology/Oncology  [facility]      Attending Note  I have personally taken the history and examined this patient and agree with the resident's note as stated above.  We spent > 30 minutes discussing current status  We  would like to see if his performance status can improve as too early to assess impact of Keytruda (only post 1 cycle)  Speech/Swallow consult  Nutrition consult  PT consult  Wound care  Additional imaging to assess weakness  Continued treatment of hypoglycemia

## 2018-04-17 NOTE — ASSESSMENT & PLAN NOTE
Patient presented with hypoglycemia and hypothermia. His WBC ~14 K but it has been in this range for weeks. He is not tachycardic. His BP is on the lower side.   - Source could be skin.   - Blood cultures are sent, will follow results.   - CXR with no clear consolidation or effusion.   - UA is clear.   - he received one dose of Zosyn in the ED. Will switch to cefepime 2 g every 12 hrs.   - 4/16: His temp is back to normal. His cultures are NGT.   - Continue cefepime  - Follow up cultures

## 2018-04-17 NOTE — CONSULTS
[facility]  Endocrinology  Consult Note    Consult Requested by: Brittany Gaines MD   Reason for admit: Hypoglycemia    HISTORY OF PRESENT ILLNESS:  Reason for Consult: Management of hypoglycemia      HPI:   Patient is a 52 y.o. male with a diagnosis of metastatic non-small cell lung cancer of the lung admitted on 4/15/18 for hypoglycemia with bg in 20's. Metastatic disease to bilateral adrenal glands, liver, and kidney. Recurrent hypoglycemia this admission requiring dextrose gtt. Endocrine consulted for hypoglycemia. On lab evaluation today, labs do not suggest insulin mediated hypoglycemia. Beta-hydroxbutyrate low making starvation ketosis less suspicious.        Medications and/or Treatments Impacting Glycemic Control:  Immunotherapy:    Immunosuppressants     None        Steroids:   Hormones     None        Pressors:    Autonomic Drugs     None          Prescriptions Prior to Admission   Medication Sig Dispense Refill Last Dose    albuterol 90 mcg/actuation inhaler Inhale 2 puffs into the lungs every 6 (six) hours as needed for Wheezing or Shortness of Breath. Rescue 18 g 99 4/14/2018    ALPRAZolam (XANAX) 0.5 MG tablet Take 1 tablet (0.5 mg total) by mouth 2 (two) times daily as needed for Anxiety. 60 tablet 0 4/14/2018    aspirin (ECOTRIN) 81 MG EC tablet Take 81 mg by mouth once daily.   4/14/2018    atorvastatin (LIPITOR) 40 MG tablet Take 40 mg by mouth once daily.   4/14/2018    cyproheptadine (PERIACTIN) 4 mg tablet Take 1 tablet (4 mg total) by mouth 3 (three) times daily as needed. For appetite 90 tablet 0 4/14/2018    fish oil-omega-3 fatty acids 300-1,000 mg capsule Take by mouth once daily.   4/14/2018    lidocaine-prilocaine (EMLA) cream Apply to affected area once 5 g 0 4/14/2018    lisinopril (PRINIVIL,ZESTRIL) 5 MG tablet Take 5 mg by mouth once daily.   4/14/2018    morphine (MS CONTIN) 15 MG 12 hr tablet Take 1 tablet (15 mg total) by mouth 2 (two) times daily. 60  tablet 0 4/14/2018    multivitamin capsule Take 1 capsule by mouth once daily.   4/14/2018    naproxen (NAPROSYN) 250 MG tablet Take 250 mg by mouth 2 (two) times daily.   4/14/2018    ondansetron (ZOFRAN-ODT) 8 MG TbDL Take 1 tablet (8 mg total) by mouth every 8 (eight) hours as needed (nausea or vomiting - CANCER PATIENT ON CHEMO). 30 tablet 1 4/14/2018    oxyCODONE-acetaminophen (PERCOCET)  mg per tablet Take 1 tablet by mouth every 4 (four) hours as needed for Pain. 120 tablet 0 4/14/2018    pantoprazole (PROTONIX) 40 MG tablet Take 1 tablet (40 mg total) by mouth once daily. 30 tablet 1 4/14/2018    traZODone (DESYREL) 50 MG tablet Take 1 tablet (50 mg total) by mouth every evening. 30 tablet 3 4/14/2018       Current Facility-Administered Medications   Medication Dose Route Frequency Provider Last Rate Last Dose    ALPRAZolam tablet 0.5 mg  0.5 mg Oral BID PRN Marcelo Mills MD   0.5 mg at 04/15/18 2012    ceFEPIme injection 2 g  2 g Intravenous Q12H Marcelo Mills MD   2 g at 04/17/18 1436    collagenase ointment   Topical (Top) Daily Marcelo Mills MD        dextrose 5 % and 0.45 % NaCl infusion   Intravenous Continuous Jovi Ken MD 75 mL/hr at 04/17/18 1436      dextrose 50% injection 12.5 g  12.5 g Intravenous PRN Marcelo Mills MD   12.5 g at 04/15/18 1744    dextrose 50% injection 25 g  25 g Intravenous PRN Marcelo Mills MD   25 g at 04/17/18 1128    glucagon (human recombinant) injection 1 mg  1 mg Intramuscular PRN Marcelo Mills MD        glucose chewable tablet 16 g  16 g Oral PRN Marcelo Mills MD        glucose chewable tablet 24 g  24 g Oral PRN Marcelo Mills MD        morphine 12 hr tablet 15 mg  15 mg Oral BID Marcelo Mills MD   15 mg at 04/17/18 0828    oxyCODONE-acetaminophen 5-325 mg per tablet 1 tablet  1 tablet Oral Q6H PRN Marcelo Mills MD        pantoprazole EC tablet 40 mg  40 mg Oral Daily Jovi Ken MD   40 mg at 04/17/18 1139    potassium, sodium  phosphates 280-160-250 mg packet 2 packet  2 packet Oral Q4H Jovi Ken MD   2 packet at 04/17/18 0828    sodium chloride 0.9% flush 5 mL  5 mL Intravenous PRN Marcelo Mills MD           Interval HPI:   Overnight events: persistent hypoglycemia  Eating:   <25%  Nausea: No  Hypoglycemia and intervention: Yes  Fever: No  TPN and/or TF: No  If yes, type of TF/TPN and rate: n/a    PMH, PSH, FH, SH updated and reviewed       Review of Systems   Constitutional: + fatigue, weight loss  Eyes: Negative for visual disturbance.  Respiratory: Negative for cough.   Cardiovascular: Negative for chest pain.  Gastrointestinal: Negative for nausea.  Endocrine: Negative for polyuria, polydipsia.  Musculoskeletal:+ for back pain.  Skin: Negative for rash.  Neurological: Negative for syncope.  Psychiatric/Behavioral: Negative for depression.      PHYSICAL EXAMINATION:  Vitals:    04/17/18 1136   BP: 101/71   Pulse: 77   Resp: 20   Temp: 98 °F (36.7 °C)     Body mass index is 12.89 kg/m².    Physical Exam   Constitutional:  Well developed, underweight, NAD.  ENT: External ears no masses with nose patent; normal hearing.   Neck:  Supple; trachea midline; no thyromegaly.   Cardiovascular: Normal heart sounds, no LE edema.     Lungs:  Normal effort; lungs anterior bilaterally clear to auscultation.  Abdomen:  Soft, no masses,  no hernias.  MS: No clubbing or cyanosis of nails noted; unable to assess gait.  Skin: No rashes, lesions, or ulcers; no nodules.  Psychiatric: Good judgement and insight; normal mood and affect.  Neurological: Cranial nerves are grossly intact.   .     ASSESSMENT and PLAN:  Mr. Caballero is a 52 yr old with advanced metastatic non-small cell lung cancer presenting with persistent hypoglycemia.  * Hypoglycemia    Non-insulin mediated hypoglycemia  Likely decreased glycogen stores/gluconeogensis from liver metastates   Lower suspicion for adrenal insufficiency given early morning cortisol  Glucocorticoids may  improve hypoglycemia mediated by Insulin like growth factor  Have ordered IGF-1 and IGF-2  Glucocorticoids may reduce IGF-2 levels and improve hypoglycemia  Could try empiric prednisone 20mg    Also recommend nutrition consultation and advance diet to full sugar (patient reports sugar free diet)        Severe malnutrition    Recommend nutrition consultation        Metastatic lung cancer (metastasis from lung to other site)    Management per keytruda        Sepsis    May contribute to hypoglycemia            DISCHARGE NEEDS: will assess daily    Emil Velazquez MD  Endocrinology  [facility]

## 2018-04-17 NOTE — PLAN OF CARE
"Problem: SLP Goal  Goal: SLP Goal    Pt seen for clinical swallow evaluation this date. Pt with long standing history of limited PO intake, feeding selectivity and preference for naturally occurring soft and smooth foods 2/2 neck mass. Pt reports long standing history of globus sensation 2/2 to presence of mass. Pt further notes "acidic foods" irritate his throat most.  Pt appears to adequately manage small single sips of thin liquids and spoonfuls of puree. Multiple swallows observed appearing consistent with underlying diagnosis and no additional overt clinical symptoms of aspiration appreciated. Vocal quality remained strong and clear throughout assessment without any evidence of coughing. Discussed with medical team Pt to continue current diet of purees and thin liquids with meds crushed in puree without need for ongoing skilled speech follow up at this time. Team demonstrated understanding and agreement with plan.     Debby Balderrama MS, CCC-SLP  Speech Language Pathologist  Pager: [phone]  Date 4/17/2018        "

## 2018-04-17 NOTE — PT/OT/SLP EVAL
"Occupational Therapy   Evaluation    Name: Teddy Caballero  MRN: [id number]  Admitting Diagnosis:  Hypoglycemia      Recommendations:     Discharge Recommendations: nursing facility, skilled (pending progress and medical course)  Discharge Equipment Recommendations:  none  Barriers to discharge:       History:     Occupational Profile:  Living Environment: Pt lives with his wife in a trailer with 4 BENJAMIN and L handrail present. Pt has a walk-in shower with shower chair present. Pt reports ~2 week PTA, pt was ambulating with rollator within his home and using w/c for community mobility. For the past week, pt has been bedridden and unable to ambulate due to significant weakness and deconditioning. Pt has required significant assistance from wife and daughter for completion of self-care tasks including dressing, bathing, and toileting.     Equipment Owned:  walker, rolling, wheelchair, rollator, hospital bed, bedside commode, shower chair  Assistance upon Discharge: Pt will have assistance from wife and daughter upon d/c but both are unable to provided 24/7 care/assistance 2* work during the day. Family members are discussing options for aid/sitter to assist pt during the day.     Past Medical History:   Diagnosis Date    Cancer     lung    Coronary artery disease 11/2014    stent x 1 in Nov. 2014; followed by Dr. Wei    Hyperlipidemia     Hypertension     NSTEMI (non-ST elevated myocardial infarction) 11/07/2014       Past Surgical History:   Procedure Laterality Date    CORONARY ANGIOPLASTY WITH STENT PLACEMENT  11/2014    dr. Wei    PORTACATH PLACEMENT Right     stent placed  11/07/2014    on placed.       Subjective     Chief Complaint: limited mobility, debility, weakness, pressure ulcer   Patient/Family stated goals: to get stronger   Communicated with: RN prior to session. Pt found supine in bed with daughter and spouse present. Pt agreeable to PT/OT therapy session.   Pt's wife stated, " I know y'all " "want to stand him right now but he's too weak and can't right now." " It's a a goal eventually to stand but not right now"     Pain/Comfort:  · Pain Rating 1: 2/10  · Location - Side 1: Bilateral  · Location - Orientation 1: generalized  · Location 1: abdomen  · Pain Addressed 1: Reposition, Distraction  · Pain Rating Post-Intervention 1: 0/10    Patients cultural, spiritual, Rastafarian conflicts given the current situation: none stated     Objective:     Patient found with: central line    General Precautions: Standard, fall   Orthopedic Precautions:N/A   Braces: N/A     Occupational Performance:    Bed Mobility:    · Patient completed Rolling/Turning to Right with maximal assistance and with side rail  · Patient completed Scooting/Bridging with total assistance, 2 persons drawsheet towards HOB and total A x 2 person for lateral scooting sitting EOB. Total A to lift pt off buttock for clearance to prevent shearing force on sacrum ulcer.   · Patient completed Supine to Sit with total assistance, with leg lift and trunk elevation.   · Patient completed Sit to Supine with total A for B LE management and controlled trunk descent.     Functional Mobility/Transfers:  · Pt refused to stand this date. Family request that therapy not assist pt with standing this date due to pt's significant weakness.     Activities of Daily Living:  · UB Dressing: not performed this date   · LB Dressing: total assistance socks and donning/doffing   · Toileting: total assistance     Cognitive/Visual Perceptual:  Cognitive/Psychosocial Skills:     -       Oriented to: Person, Place, Time and Situation   -       Follows Commands/attention:Follows two-step commands  -       Communication: clear/fluent, hard of hearing  -       Memory: No Deficits noted  -       Safety awareness/insight to disability: impaired   -       Mood/Affect/Coping skills/emotional control: Appropriate to situation  Visual/Perceptual:      -Impaired  acuity    Physical " Exam:  Balance:    - Static sit: CGA <> min A   -  Dynamic sit: mod A     Postural examination/scapula alignment:    -       Rounded shoulders  -       Forward head  -       Abnormal trunk flexion   -       Significant muscle atrophy   Skin integrity: Wound pressure ulcer on sacrum , Thin and able to significantly observe bony prominences throughout  Edema:  None noted  Sensation:    -       Intact  light/touch BUEs  Dominant hand:    -       right  Upper Extremity Range of Motion:     -       Right Upper Extremity: limited shld flexion ~90*, all available planes WFL  -       Left Upper Extremity: limited shld flexion ~90*, all available planes WFL  Upper Extremity Strength:    -       Right Upper Extremity: Deficits: grossly 3-/5   -       Left Upper Extremity: Deficits: grossly 3-/5    Strength:    -       Right Upper Extremity: Deficits: 3/5   -       Left Upper Extremity: Deficits: 3/5   Fine Motor Coordination:    -       Intact  Left hand thumb/finger opposition skills and Right hand thumb/finger opposition skills - increased time required   Gross motor coordination:   WFL BUEs    Patient left HOB elevated with all lines intact, call button in reach, RN notified, Charge RN notified of ants in room, and spouse and daughter  present    [facility] 6 Click:  [facility] Total Score: 9    Treatment & Education:  Pt educated by therapist on:   - Pt educated on role of OT, POC, and goals for therapy.    - Pt educated on potential d/c disposition and benefits of rehab prior to returning to the home environment   - Importance of pressure reliefs for prevention of pressure ulcers and checking all bony prominence and skin daily.   - benefits of acute therapy and importance of OOB mobility to prevent further decline in functional mobility.   - Extensive time provided for therapeutic counseling and discussion of health disposition.   - Importance of OOB ax's with staff member assistance and sitting OOB majority of day.   - Pt and  "family verbalized understanding. Pt and family expressed no further concerns/questions.  - Charge RN notified of ants in room.   - RNTiffany notified to order wedge for postioning and off loading pressure on sacrum ulcer.   - whiteboard updated   Education:    Assessment:     Teddy Caballero is a 52 y.o. male with a medical diagnosis of Hypoglycemia.  He presents with performance deficits affecting function are weakness, impaired functional mobilty, impaired self care skills, gait instability, impaired endurance, impaired balance, decreased coordination, decreased upper extremity function, decreased lower extremity function, pain, impaired coordination, decreased ROM, impaired fine motor, impaired joint extensibility, impaired skin, decreased safety awareness.  Pt requires significant assistance at this time with mobility and self-care tasks. Pt is noted to have significant muscle atrophy and weakness throughout musculature. Pt and family refused to attempt sit<>stand due to pt's significant weakness. OT recommending SNF at this time however will further assess pending medical course. Pt will continue to benefit from skilled OT in order to improve return to PLOF.     Rehab Prognosis:  fair; patient would benefit from acute skilled OT services to address these deficits and reach maximum level of function.         Clinical Decision Makin.  OT Mod:  "Pt evaluation falls under moderate complexity for evaluation coding due to identification of 3-5 performance deficits noted as stated above. Eval required Min/Mod assistance to complete on this date and detailed assessment(s) were utilized. Moreover, an expanded review of history and occupational profile obtained with additional review of cognitive, physical and psychosocial hx."     Plan:     Patient to be seen 3 x/week to address the above listed problems via self-care/home management, therapeutic activities, therapeutic exercises  · Plan of Care Expires: " 05/16/18  · Plan of Care Reviewed with: patient, spouse, daughter    This Plan of care has been discussed with the patient who was involved in its development and understands and is in agreement with the identified goals and treatment plan    GOALS:    Occupational Therapy Goals        Problem: Occupational Therapy Goal    Goal Priority Disciplines Outcome Interventions   Occupational Therapy Goal     OT, PT/OT Ongoing (interventions implemented as appropriate)    Description:  Goals to be met by: 5/1/18     Patient will increase functional independence with ADLs by performing:    Grooming while EOB with Set-up Assistance.  Toileting from bedside commode with Moderate Assistance for hygiene and clothing management.   Supine to sit with Minimal Assistance.  Stand pivot transfers with Moderate Assistance.  Toilet transfer to bedside commode with Moderate Assistance.                      Time Tracking:     OT Date of Treatment: 04/17/18  OT Start Time: 0913  OT Stop Time: 0945  OT Total Time (min): 32 min    Billable Minutes:Evaluation 16  Therapeutic Activity 16    Christi Barillas OT  4/17/2018

## 2018-04-17 NOTE — HPI
Reason for Consult: Management of hypoglycemia      HPI:   Patient is a 52 y.o. male with a diagnosis of metastatic non-small cell lung cancer of the lung admitted on 4/15/18 for hypoglycemia with bg in 20's. Metastatic disease to bilateral adrenal glands, liver, and kidney. Recurrent hypoglycemia this admission requiring dextrose gtt. Endocrine consulted for hypoglycemia. On lab evaluation today, labs do not suggest insulin mediated hypoglycemia. Beta-hydroxbutyrate low making starvation ketosis less suspicious.

## 2018-04-17 NOTE — SUBJECTIVE & OBJECTIVE
Interval HPI:   Overnight events: persistent hypoglycemia  Eating:   <25%  Nausea: No  Hypoglycemia and intervention: Yes  Fever: No  TPN and/or TF: No  If yes, type of TF/TPN and rate: n/a    PMH, PSH, FH, SH updated and reviewed       Review of Systems   Constitutional: + fatigue, weight loss  Eyes: Negative for visual disturbance.  Respiratory: Negative for cough.   Cardiovascular: Negative for chest pain.  Gastrointestinal: Negative for nausea.  Endocrine: Negative for polyuria, polydipsia.  Musculoskeletal:+ for back pain.  Skin: Negative for rash.  Neurological: Negative for syncope.  Psychiatric/Behavioral: Negative for depression.      PHYSICAL EXAMINATION:  Vitals:    04/17/18 1136   BP: 101/71   Pulse: 77   Resp: 20   Temp: 98 °F (36.7 °C)     Body mass index is 12.89 kg/m².    Physical Exam   Constitutional:  Well developed, underweight, NAD.  ENT: External ears no masses with nose patent; normal hearing.   Neck:  Supple; trachea midline; no thyromegaly.   Cardiovascular: Normal heart sounds, no LE edema.     Lungs:  Normal effort; lungs anterior bilaterally clear to auscultation.  Abdomen:  Soft, no masses,  no hernias.  MS: No clubbing or cyanosis of nails noted; unable to assess gait.  Skin: No rashes, lesions, or ulcers; no nodules.  Psychiatric: Good judgement and insight; normal mood and affect.  Neurological: Cranial nerves are grossly intact.

## 2018-04-17 NOTE — ASSESSMENT & PLAN NOTE
- Patient BS was 20 on EMS arrival. He had full recovery of AMS after glucose administration  - Etiology may be related malnutrition, malignancy, sepsis    - His TSH is low. T4 is normal  - AM cortisol 29.5  - AM glucose POCT ~127 g/dl  - His BS drops off the D5 drip  - Continue D5 1/2NS at 75 cc/hr  - Continue to monitor POCT glucose every 2 hour

## 2018-04-17 NOTE — PLAN OF CARE
Problem: Patient Care Overview  Goal: Plan of Care Review  Outcome: Ongoing (interventions implemented as appropriate)  No c/o nausea / pain. Using urinal. D5 0.45 NS at 75/ hr. Vital signs stable. POCT Q 2. Frequents rounds made to assess pain and safety. Patient remained free from falls. Bed in lowest position. Side rails up X 2. Call light within reach. Will continue to monitor.

## 2018-04-17 NOTE — PROGRESS NOTES
Follow up to see if patient was placed on SPR+ pump to convert to a low air loss support surface. Pump had arrived, but was not placed on the bed. WOC applied the SPR+ pump to the bed.   Shashi Herrera RN CWON  [id number]

## 2018-04-17 NOTE — PLAN OF CARE
Problem: Physical Therapy Goal  Goal: Physical Therapy Goal  Goals to be met by: 18     Patient will increase functional independence with mobility by performin. Supine to sit with Moderate Assistance  2. Sit to supine with Moderate Assistance  3. Sit to stand transfer with Moderate Assistance  4. Bed to chair transfer with Moderate Assistance using AD as needed  5. Lower extremity exercise program x15 reps, with assistance as needed, in order to increase LE strength and (I) with functional mobility.     *Gait goal to be implemented as appropriate    Outcome: Ongoing (interventions implemented as appropriate)  PT evaluation complete and appropriate goals established.    Rosangela Beckman, PT, DPT   2018  [phone]

## 2018-04-17 NOTE — PT/OT/SLP EVAL
"Speech Language Pathology Evaluation/ Discharge Summary   Bedside Swallow    Patient Name:  Teddy Caballero   MRN:  [id number]  Admitting Diagnosis: Hypoglycemia    Recommendations:                 General Recommendations:  Follow-up not indicated  Diet recommendations:  Puree, Thin   Aspiration Precautions: 1 bite/sip at a time and Alternating bites/sips   General Precautions: Standard,    Communication strategies:  none    History:     Past Medical History:   Diagnosis Date    Cancer     lung    Coronary artery disease 11/2014    stent x 1 in Nov. 2014; followed by Dr. Wei    Hyperlipidemia     Hypertension     NSTEMI (non-ST elevated myocardial infarction) 11/07/2014       Past Surgical History:   Procedure Laterality Date    CORONARY ANGIOPLASTY WITH STENT PLACEMENT  11/2014    dr. Wei    PORTACATH PLACEMENT Right     stent placed  11/07/2014    on placed.     Social History: Patient lives with spouse, daughter also at the bedside     Prior Intubation HX:  N/A     Modified Barium Swallow: N/A     Prior diet: naturally occurring purees/ soups/     Subjective     "things just feel like the get stuck sometimes because of the mass pushing on my throat"     Pain/Comfort:  · Pain Rating 1: 0/10  · Pain Rating Post-Intervention 1: 0/10    Objective:     Oral Musculature Evaluation  · Oral Musculature: WFL  · Dentition: present and adequate  · Oral Labial Strength and Mobility: WFL  · Volitional Cough: strong   · Volitional Swallow: prompt   · Voice Prior to PO Intake: strong and clear     Bedside Swallow Eval:   Consistencies Assessed:  · Thin liquids 3 oz via small single sips from a straw   · Puree tsp x5      Oral Phase:   · WFL    Pharyngeal Phase:   · multiple spontaneous swallows    Compensatory Strategies  · None   · Pt managing with small bites sips and self restricting to soft smooth foods     Treatment:   Pt with long standing history of limited PO intake, feeding selectivity and preference for " "naturally occurring soft and smooth foods 2/2 neck mass. Pt reports long standing history of globus sensation 2/2 to presence of mass. Pt further notes "acidic foods" irritate his throat most.  Pt appears to adequately manage small single sips of thin liquids and spoonfuls of puree. Multiple swallows observed appearing consistent with underlying diagnosis and no additional overt clinical symptoms of aspiration appreciated. Vocal quality remained strong and clear throughout assessment without any evidence of coughing. Discussed with medical team Pt to continue current diet of purees and thin liquids with meds crushed in puree without need for ongoing skilled speech follow up at this time. Team demonstrated understanding and agreement with plan.       Assessment:     Tdedy Caballero is a 52 y.o. male with an SLP diagnosis of baseline oral feeding and swallowing skills.     Goals:    SLP Goals        Problem: SLP Goal    Goal Priority Disciplines Outcome   SLP Goal     SLP                    Plan:     · Plan of Care reviewed with:  patient, spouse   · SLP Follow-Up:  No       Discharge recommendations:  home   Barriers to Discharge:  None    Time Tracking:     SLP Treatment Date:   04/17/18  Speech Start Time:  0948  Speech Stop Time:  1002     Speech Total Time (min):  14 min    Billable Minutes: Eval Swallow and Oral Function 14    Debby Balderrama CCC-SLP  04/17/2018           "

## 2018-04-17 NOTE — SUBJECTIVE & OBJECTIVE
Interval History:   Patient seen and examined this AM. Patient reports NAEON. Patient unable to obtain MRI lumbar spine overnight due to hypoglycemia and then delayed due to emergent MRIs. Patient had no further episodes of confusion. Patient notes continued mild cough, but he denies chest pain, SOB, abdo pain, n/v, dysuria. He is upset his food is not the ordered consistency.      Oncology Treatment Plan:   OP MELANOMA PEMBROLIZUMAB    Medications:  Continuous Infusions:   dextrose 5 % and 0.45 % NaCl 75 mL/hr at 04/16/18 2252     Scheduled Meds:   ceFEPime (MAXIPIME) IVPB  2 g Intravenous Q12H    collagenase   Topical (Top) Daily    morphine  15 mg Oral BID    potassium, sodium phosphates  2 packet Oral Q4H     PRN Meds:ALPRAZolam, dextrose 50%, dextrose 50%, glucagon (human recombinant), glucose, glucose, oxyCODONE-acetaminophen, sodium chloride 0.9%     Review of Systems   Constitutional: Positive for activity change, appetite change and fatigue. Negative for chills and fever.   HENT: Positive for trouble swallowing.    Respiratory: Positive for cough (mild). Negative for shortness of breath.    Cardiovascular: Negative for chest pain and leg swelling.   Gastrointestinal: Negative for abdominal distention, abdominal pain, blood in stool, nausea and vomiting.   Genitourinary: Negative for dysuria and hematuria.   Musculoskeletal: Positive for back pain.   Skin: Positive for wound.   Neurological: Positive for weakness.   Psychiatric/Behavioral: Negative for confusion.     Objective:     Vital Signs (Most Recent):  Temp: 97.7 °F (36.5 °C) (04/17/18 0342)  Pulse: 77 (04/17/18 0342)  Resp: 18 (04/17/18 0342)  BP: 94/64 (04/17/18 0342)  SpO2: (!) 92 % (04/17/18 0342) Vital Signs (24h Range):  Temp:  [97.7 °F (36.5 °C)-98.1 °F (36.7 °C)] 97.7 °F (36.5 °C)  Pulse:  [77-91] 77  Resp:  [17-18] 18  SpO2:  [90 %-95 %] 92 %  BP: (86-99)/(59-69) 94/64     Weight: 39.6 kg (87 lb 4.8 oz)  Body mass index is 12.89  kg/m².  Body surface area is 1.39 meters squared.      Intake/Output Summary (Last 24 hours) at 04/17/18 0732  Last data filed at 04/17/18 0500   Gross per 24 hour   Intake          2062.08 ml   Output             1025 ml   Net          1037.08 ml       Physical Exam   Constitutional: He is oriented to person, place, and time. He appears cachectic. No distress.   HENT:   Head: Normocephalic and atraumatic.   Eyes: EOM are normal.   Neck: Neck supple. No JVD present.   Cardiovascular: Normal rate, regular rhythm, normal heart sounds and intact distal pulses.    No murmur heard.  He has a port on his anterior chest wall.      Pulmonary/Chest: No respiratory distress. He has no wheezes.   Decreased breath sounds bilaterally.      Abdominal: Soft. He exhibits no distension. There is no tenderness.   Musculoskeletal: He exhibits no edema.   Neurological: He is alert and oriented to person, place, and time. A sensory deficit (feet numbness ) is present.   His motor power on his feet +2 maximum and it is worse on the left side with sever muscle atrophy.   Legs and thighs are +3.   Upper extremities +3.    Skin: Skin is warm. He is not diaphoretic.   He has skin breakdown on his tail bone.    Psychiatric: He has a normal mood and affect.   Vitals reviewed.      Significant Labs:   CBC:     Recent Labs  Lab 04/15/18  1123 04/16/18  0425 04/17/18  0403   WBC 14.84* 14.11* 11.53   HGB 13.7* 11.9* 11.5*   HCT 39.1* 33.2* 33.1*   * 108* 69*    and CMP:     Recent Labs  Lab 04/15/18  1123 04/16/18  0425 04/17/18  0403    137 138   K 3.9 3.8 3.6    111* 114*   CO2 19* 19* 18*   * 88 70   BUN 80* 75* 48*   CREATININE 0.8 0.7 0.5   CALCIUM 7.9* 7.5* 7.5*   PROT 5.4* 4.8* 4.3*   ALBUMIN 2.5* 2.3* 1.9*   BILITOT 0.8 0.9 0.8   ALKPHOS 146* 121 102   AST 78* 45* 30   ALT 88* 76* 69*   ANIONGAP 8 7* 6*   EGFRNONAA >60.0 >60.0 >60.0       Diagnostic Results:  MRI: brain/ no metastesis.

## 2018-04-17 NOTE — PLAN OF CARE
Problem: Patient Care Overview  Goal: Plan of Care Review  Outcome: Ongoing (interventions implemented as appropriate)  Pt AAOx4. Pt had one incident of hypoglycemia with BG of 62, 55, and 46 when  D5 .45% NS was discontinued; 25mg of D5 administered IV push and STAT BMP drawn. Pt tolerated well. IVF reinitiated and pt's BG returned to normal limits. IV cefepime administered as ordered. 2 unstageable decubitus ulcers on sacrum covered in mepilex; CDI. Pt refused weight shift assistance. Pt has remained free from injury this shift. Pt had no c/o nausea or pain this shift. Pt's wife at bedside and involved in care. Bed in low locked position. Call light and personal belongings within reach. Side rails up x2. Nonskid socks in place. All questions and comments addressed at this time. Will continue to monitor.  Fall, Pressure ulcer, infection precautions continued.

## 2018-04-17 NOTE — PT/OT/SLP EVAL
Physical Therapy Evaluation    Patient Name:  Teddy Caballero   MRN:  [id number]    Recommendations:     Discharge Recommendations:  nursing facility, skilled (pending progress and hospital course)   Discharge Equipment Recommendations: none   Barriers to discharge: Inaccessible home and Decreased caregiver support (steps to enter home; 24-hour assist not available)    Assessment:     Teddy Caballero is a 52 y.o. male admitted with a medical diagnosis of Hypoglycemia.  He presents with the following impairments/functional limitations:  weakness, impaired functional mobilty, impaired endurance, impaired self care skills, gait instability, impaired balance, decreased upper extremity function, decreased lower extremity function, decreased safety awareness, pain, impaired muscle length, impaired joint extensibility, decreased ROM; h/o lung cancer with metastasis to liver and kidney. Pt has been bed-bound for ~2 weeks PTA and presents with notable deconditioning. Pt requiring increased assist to complete bed mobility and seated scooting along EOB this date. Pt and family declined standing attempt during PT evaluation, stating that pt is too weak to perform at this time. Education provided throughout session. Pt would benefit from skilled acute PT in order to address current deficits and progress functional mobility. Based on pt presentation during PT evaluation, recommend SNF upon d/c; however, d/c recs are pending pt progress and hospital course.     Rehab Prognosis:  fair; patient would benefit from acute skilled PT services to address these deficits and reach maximum level of function.      Recent Surgery: * No surgery found *      Plan:     During this hospitalization, patient to be seen 3 x/week to address the above listed problems via gait training, therapeutic activities, therapeutic exercises, neuromuscular re-education, wheelchair management/training  · Plan of Care Expires:  05/16/18   Plan of Care Reviewed  with: patient, spouse, daughter    Subjective     Communicated with RN prior to session.  Patient found supine upon PT entry to room, agreeable to evaluation.      Chief Complaint: abdominal pain; weakness   Patient comments/goals: Pt's family reports that they would like to learn techniques for assisting pt with bed mobility and positioning. Pt and family report that standing may eventually be a goal, but they would like to improve nutrition and medical status first.   Pain/Comfort:  · Pain Rating 1: 2/10  · Location - Side 1: Bilateral  · Location - Orientation 1: generalized  · Location 1: abdomen  · Pain Addressed 1: Reposition, Distraction    Patients cultural, spiritual, Temple conflicts given the current situation: none noted     Living Environment:  Pt lives with his wife in a mobile home with 4 BENJAMIN, L handrail. Pt's family states that pt had a recent decline and has not ambulated in ~2 weeks. Pt was previously ambulating household distances with rollator and using w/c for community distances. Since recent decline, pt has primarily been bed-bound and has required assist with ADLs. Pt's family is able to assist but work during the day and are unable to provide 24-hour assist.   Prior to admission, patients level of function was requiring assist and DME.  Patient has the following equipment: walker, rolling, wheelchair, rollator, hospital bed, bedside commode, shower chair. Upon discharge, patient will have assistance from family; however, 24-hour assist not available due to family members working.    Objective:     Patient found with: central line     General Precautions: Standard, fall   Orthopedic Precautions:N/A   Braces: N/A     Exams:  · Cognitive Exam:  Patient is oriented to Person, Place, Time and Situation and follows 100% of two-step commands   · Postural Exam:  Patient presented with the following abnormalities:    · -       Rounded shoulders  · -       Forward head  · -       Posterior pelvic  tilt  · Sensation:    · -       Intact  · Skin Integrity/Edema:      · -       Skin integrity: sacral pressure ulcer  · Muscular atrophy throughout  · RLE ROM: PROM WFL at hip; decreased knee ext 2* HS tightness; slightly decreased ankle DF  · RLE Strength: unable to perform active hip flexion or ankle DF against gravity; knee ext 2-/5  · LLE ROM: PROM WFL at hip; decreased knee ext 2* HS tightness; slightly decreased ankle DF  · LLE Strength: unable to perform active hip flexion or ankle DF against gravity; knee ext 2-/5    Functional Mobility:  · Bed Mobility:     · Rolling Right: maximal assistance x2 reps with side rail; max cues throughout for technique and sequencing   · Scooting: total assistance of 2 persons for seated scooting along EOB x2 reps and for supine scooting with drawsheet to HOB   · Supine to Sit: total assistance; max cues for technique and sequencing   · Sit to Supine: total assistance; max cues for technique and sequencing   · Balance:   · static sitting:  CGA-Fly   · dynamic sitting: min-modA     AM-PAC 6 CLICK MOBILITY  Total Score:8       Therapeutic Activities and Exercises:   Pt and family educated on role of PT and PT POC. Pt and family verbalized understanding.   Pt and family educated on safe bed mobility techniques and positioning. Educated on pressure relief, turning schedule, and use of wedge and pressure relief boots to assist. Wedge to be ordered by RN. Educated on positioning pressure relief boots in ankle DF to maintain gastro-soleus length.    Pt and family declining standing attempt during evaluation. Pt and family educated on the effects of bed rest and the importance of beginning OOB activity in order to combat effects of bed rest and prevent further deconditioning.  Pt and family verbalized understanding but continued to decline, stating that pt is too weak to stand and they would like to focus on nutrition and medical status first.   Discussed pt with medical team  following session. Team notified of current d/c recs.     Patient left supine with all lines intact, call button in reach, MD notified and pt's wife and daughter present.    GOALS:    Physical Therapy Goals        Problem: Physical Therapy Goal    Goal Priority Disciplines Outcome Goal Variances Interventions   Physical Therapy Goal     PT/OT, PT Ongoing (interventions implemented as appropriate)     Description:  Goals to be met by: 18     Patient will increase functional independence with mobility by performin. Supine to sit with Moderate Assistance  2. Sit to supine with Moderate Assistance  3. Sit to stand transfer with Moderate Assistance  4. Bed to chair transfer with Moderate Assistance using AD as needed  5. Lower extremity exercise program x15 reps, with assistance as needed, in order to increase LE strength and (I) with functional mobility.     *Gait goal to be implemented as appropriate                      History:     Past Medical History:   Diagnosis Date    Cancer     lung    Coronary artery disease 2014    stent x 1 in 2014; followed by Dr. Wei    Hyperlipidemia     Hypertension     NSTEMI (non-ST elevated myocardial infarction) 2014       Past Surgical History:   Procedure Laterality Date    CORONARY ANGIOPLASTY WITH STENT PLACEMENT  2014    dr. Wei    PORTACATH PLACEMENT Right     stent placed  2014    on placed.       Clinical Decision Making:     History  Co-morbidities and personal factors that may impact the plan of care Examination  Body Structures and Functions, activity limitations and participation restrictions that may impact the plan of care Clinical Presentation   Decision Making/ Complexity Score   Co-morbidities:   [x] Time since onset of injury / illness / exacerbation  [x] Status of current condition  [x]Patient's cognitive status and safety concerns    [x] Multiple Medical Problems (see med hx)  Personal Factors:   [] Patient's age  [x]  Prior Level of function   [x] Patient's home situation (environment and family support)  [] Patient's level of motivation  [x] Expected progression of patient       HISTORY:(criteria)    [] 08911 - no personal factors/history    [] 91473 - has 1-2 personal factor/comorbidity     [x] 58416 - has >3 personal factor/comorbidity     Body Regions:  [] Objective examination findings  [] Head     []  Neck  [] Trunk   [] Upper Extremity  [x] Lower Extremity    Body Systems:  [] For communication ability, affect, cognition, language, and learning style: the assessment of the ability to make needs known, consciousness, orientation (person, place, and time), expected emotional /behavioral responses, and learning preferences (eg, learning barriers, education  needs)  [x] For the neuromuscular system: a general assessment of gross coordinated movement (eg, balance, gait, locomotion, transfers, and transitions) and motor function  (motor control and motor learning)  [x] For the musculoskeletal system: the assessment of gross symmetry, gross range of motion, gross strength, height, and weight  [x] For the integumentary system: the assessment of pliability(texture), presence of scar formation, skin color, and skin integrity  [] For cardiovascular/pulmonary system: the assessment of heart rate, respiratory rate, blood pressure, and edema     Activity limitations:    [x] Patient's cognitive status and saf ety concerns          [x] Status of current condition      [] Weight bearing restriction  [] Cardiopulmunary Restriction    Participation Restrictions:   [] Goals and goal agreement with the patient     [x] Rehab potential (prognosis) and probable outcome      Examination of Body System: (criteria)    [] 01426 - addressing 1-2 elements    [] 14169 - addressing a total of 3 or more elements     [x] 58503 -  Addressing a total of 4 or more elements         Clinical Presentation: (criteria)  Evolving - 67689     On examination of body  system using standardized tests and measures patient presents with 4 or more elements from any of the following: body structures and functions, activity limitations, and/or participation restrictions.  Leading to a clinical presentation that is considered evolving with changing characteristics                              Clinical Decision Making  (Eval Complexity):  Moderate - 45130     Time Tracking:     PT Received On: 04/17/18  PT Start Time: 0912     PT Stop Time: 0947  PT Total Time (min): 35 min     Billable Minutes: Evaluation 19 and Therapeutic Activity 8  (co-eval with OT)    Rosangela Beckman, PT, DPT   4/17/2018  [phone]

## 2018-04-17 NOTE — ASSESSMENT & PLAN NOTE
Non-insulin mediated hypoglycemia  Likely decreased glycogen stores/gluconeogensis from liver metastates   Lower suspicion for adrenal insufficiency given early morning cortisol  Glucocorticoids may improve hypoglycemia mediated by Insulin like growth factor  Have ordered IGF-1 and IGF-2  Glucocorticoids may reduce IGF-2 levels and improve hypoglycemia  Could try empiric prednisone 20mg    Also recommend nutrition consultation and advance diet to full sugar (patient reports sugar free diet)

## 2018-04-17 NOTE — ASSESSMENT & PLAN NOTE
- small sacral bed sore  - poor wound healing due to malnutrition  - wound care consulted, dudley luis

## 2018-04-17 NOTE — ASSESSMENT & PLAN NOTE
He has trouble swallowing anything but liquids.   - Nutrition is consulted, appreciate recs  - SLP consulted, appreciate recs

## 2018-04-17 NOTE — PLAN OF CARE
Problem: Occupational Therapy Goal  Goal: Occupational Therapy Goal  Goals to be met by: 5/1/18     Patient will increase functional independence with ADLs by performing:    Grooming while EOB with Set-up Assistance.  Toileting from bedside commode with Moderate Assistance for hygiene and clothing management.   Supine to sit with Minimal Assistance.  Stand pivot transfers with Moderate Assistance.  Toilet transfer to bedside commode with Moderate Assistance.    Outcome: Ongoing (interventions implemented as appropriate)  Initial eval completed   POC implemented and goals established     Christi Barillas OT  4/17/2018

## 2018-04-17 NOTE — PROGRESS NOTES
Patient Consulted by CTTS:     The following was discussed by the Tobacco Treatment Specialist:  Relevance of Quitting    Information given to patient concerning the [facility] smoking cessation program.

## 2018-04-17 NOTE — PROGRESS NOTES
"[facility]  Hematology/Oncology  Progress Note    Patient Name: Teddy Caballero  Admission Date: 4/15/2018  Hospital Length of Stay: 1 days  Code Status: Full Code     Subjective:     HPI:  52 year old male with a history of lung cancer with metastasis to kidney and liver on Kytruda. He presented to the ED via EMS with AMS.   Over the past week he became more lethargic and weak. He became bedridden. He used to walk around with a walker. He reports decreased PO intake. On the morning of admission he was unresponsive according to his sister. He was opening his eyes and starring. She called EMS. On their arrival his BS was 21.  The patient was given 1 amp of D50 and spontaneously returned to his baseline and was conversant. He has a small bedsore on his tailbone area about a quarter in size. Her also reports large amount of foul smelling BM over the past few days.    He denies fever, chills, cough, nausea, vomiting or dysuria.   He got his 1st chemotherapy 3 weeks ago.     Oncologic history per last clinic note:   52 year old male to clinic for follow-up after recent biopsy with Dr. Nicholson. Pt referred from GI after evaluation for abd pain revealed what appeared to be metastatic disease throughout the abdomen, including adrenal, renal mass, LNs, and questionable liver met.       Bx c/w NSCLC - [facility] - [id number] showed no actionable mutations.  Awaiting PD-L1 status.       Notes 30 lb wt loss over the last few months, including 10 lbs in the last week or so.  Notes abdominal pain, constipation, nausea, etc.       PS = 1-2      2/23/18- CT Abdomen/Pelvis "Large bilateral adrenal masses with mesenteric lymphadenopathy highly concerning for metastatic disease. Bilateral hypodense renal lesions which appear to enhance highly concerning for neoplasm such as renal cell carcinoma. Consider further evaluation with CT of the abdomen with and without contrast renal mass protocol.    2 small ill-defined " "hepatic hypodensities which are not cystic and do not have the enhancing pattern of a hemangioma highly concerning for metastatic disease."     2/26/18- CT Chest " Spiculated left upper lobe mass measuring up to 6.7 cm concerning for primary malignancy.Supraclavicular and mediastinal lymphadenopathy suggestive of metastases.Bilateral adrenal masses and ill-defined hypodense hepatic and renal lesions are suggestive of metastatic disease, better visualized on recent CT abdomen pelvis of the 2/23/2018."    Interval History:   Patient seen and examined this AM. Patient reports NAEON. Patient unable to obtain MRI lumbar spine overnight due to hypoglycemia and then delayed due to emergent MRIs. Patient had no further episodes of confusion. Patient notes continued mild cough, but he denies chest pain, SOB, abdo pain, n/v, dysuria. He is upset his food is not the ordered consistency.      Oncology Treatment Plan:   OP MELANOMA PEMBROLIZUMAB    Medications:  Continuous Infusions:   dextrose 5 % and 0.45 % NaCl 75 mL/hr at 04/16/18 0006     Scheduled Meds:   ceFEPime (MAXIPIME) IVPB  2 g Intravenous Q12H    collagenase   Topical (Top) Daily    morphine  15 mg Oral BID    potassium, sodium phosphates  2 packet Oral Q4H     PRN Meds:ALPRAZolam, dextrose 50%, dextrose 50%, glucagon (human recombinant), glucose, glucose, oxyCODONE-acetaminophen, sodium chloride 0.9%     Review of Systems   Constitutional: Positive for activity change, appetite change and fatigue. Negative for chills and fever.   HENT: Positive for trouble swallowing.    Respiratory: Positive for cough (mild). Negative for shortness of breath.    Cardiovascular: Negative for chest pain and leg swelling.   Gastrointestinal: Negative for abdominal distention, abdominal pain, blood in stool, nausea and vomiting.   Genitourinary: Negative for dysuria and hematuria.   Musculoskeletal: Positive for back pain.   Skin: Positive for wound.   Neurological: Positive for " weakness.   Psychiatric/Behavioral: Negative for confusion.     Objective:     Vital Signs (Most Recent):  Temp: 97.7 °F (36.5 °C) (04/17/18 0342)  Pulse: 77 (04/17/18 0342)  Resp: 18 (04/17/18 0342)  BP: 94/64 (04/17/18 0342)  SpO2: (!) 92 % (04/17/18 0342) Vital Signs (24h Range):  Temp:  [97.7 °F (36.5 °C)-98.1 °F (36.7 °C)] 97.7 °F (36.5 °C)  Pulse:  [77-91] 77  Resp:  [17-18] 18  SpO2:  [90 %-95 %] 92 %  BP: (86-99)/(59-69) 94/64     Weight: 39.6 kg (87 lb 4.8 oz)  Body mass index is 12.89 kg/m².  Body surface area is 1.39 meters squared.      Intake/Output Summary (Last 24 hours) at 04/17/18 0732  Last data filed at 04/17/18 0500   Gross per 24 hour   Intake          2062.08 ml   Output             1025 ml   Net          1037.08 ml       Physical Exam   Constitutional: He is oriented to person, place, and time. He appears cachectic. No distress.   HENT:   Head: Normocephalic and atraumatic.   Eyes: EOM are normal.   Neck: Neck supple. No JVD present.   Cardiovascular: Normal rate, regular rhythm, normal heart sounds and intact distal pulses.    No murmur heard.  He has a port on his anterior chest wall.      Pulmonary/Chest: No respiratory distress. He has no wheezes.   Decreased breath sounds bilaterally.      Abdominal: Soft. He exhibits no distension. There is no tenderness.   Musculoskeletal: He exhibits no edema.   Neurological: He is alert and oriented to person, place, and time. A sensory deficit (feet numbness ) is present.   His motor power on his feet +2 maximum and it is worse on the left side with sever muscle atrophy.   Legs and thighs are +3.   Upper extremities +3.    Skin: Skin is warm. He is not diaphoretic.   He has skin breakdown on his tail bone.    Psychiatric: He has a normal mood and affect.   Vitals reviewed.      Significant Labs:   CBC:     Recent Labs  Lab 04/15/18  1123 04/16/18  0425 04/17/18  0403   WBC 14.84* 14.11* 11.53   HGB 13.7* 11.9* 11.5*   HCT 39.1* 33.2* 33.1*   *  108* 69*    and CMP:     Recent Labs  Lab 04/15/18  1123 04/16/18  0425 04/17/18  0403    137 138   K 3.9 3.8 3.6    111* 114*   CO2 19* 19* 18*   * 88 70   BUN 80* 75* 48*   CREATININE 0.8 0.7 0.5   CALCIUM 7.9* 7.5* 7.5*   PROT 5.4* 4.8* 4.3*   ALBUMIN 2.5* 2.3* 1.9*   BILITOT 0.8 0.9 0.8   ALKPHOS 146* 121 102   AST 78* 45* 30   ALT 88* 76* 69*   ANIONGAP 8 7* 6*   EGFRNONAA >60.0 >60.0 >60.0       Diagnostic Results:  MRI: brain/ no metastesis.     Assessment/Plan:     * Hypoglycemia    - Patient BS was 20 on EMS arrival. He had full recovery of AMS after glucose administration  - Etiology may be related malnutrition, malignancy, sepsis    - His TSH is low. T4 is normal  - AM cortisol 29.5  - AM glucose POCT ~127 g/dl  - His BS drops off the D5 drip  - Continue D5 1/2NS at 75 cc/hr  - Continue to monitor POCT glucose every 2 hour         Weakness    - He has generalized weakness related to his severe malnutrition.   - His weakness is more prominent in the lower extremities.   - MRI lumbar spine ordered, will follow up        Severe malnutrition    - Reports dysphagia to solids   - Nutrition is consulted, appreciate recs  - SLP consulted, appreciate recs  - Full liquid diet for now        Metastatic lung cancer (metastasis from lung to other site)    - Patient with recent diagnosis of lung cancer.   - He started Keytruda therapy 3 weeks ago, hold while inpatient.  - He follows with Dr. Meadows.   - for more details see HPI.         Sepsis    - Patient presented with hypoglycemia and hypothermia.   - His WBC ~14 K but it has been in this range for weeks.   - He is not tachycardic but his BP is on the lower side.   - Source could be skin.   - Blood cultures are sent, ngtd  - CXR with no clear consolidation or effusion.   - UA is clear.   - he received one dose of Zosyn in the ED and switched to cefepime 2 g every 12 hrs.   - Continue cefepime, consider de-escalating over the next 24-48hr  -  Follow up cultures        Decubitus ulcer of sacral region    - small sacral bed sore  - poor wound healing due to malnutrition  - wound care consulted, appreciate recs        Altered mental status    - resolved and due to hypoglycemia.           DISPO: pending clinical improvement      Jovi Ken MD (PGY-5)  Hematology/Oncology Fellow  Will discuss with Dr. Gaines (Hematology/Oncology Staff)    Attending Note  I have personally taken the history and examined this patient and agree with the fellow's note as stated above.  Long discussion held with patient and wife about performance status and concerns  There is a delay before response would be expected to Keytruda and it is unclear if he clinically will make it until that point with current issues  We discussed thrombocytopenia differential and work-up  We reviewed speech, PT/OT and nutrition evaluations  Awaiting MRI   Endocrine consulted

## 2018-04-18 NOTE — ASSESSMENT & PLAN NOTE
Malnutrition in the context of Chronic Illness/Injury and Social/Environmental Circumstances     Related to (etiology):  Lung cancer and picky eater- refused all RD suggestions    Signs and Symptoms (as evidenced by):  Energy Intake: <50% of estimated energy requirement for 1yr  Body Fat Depletion: severe depletion of orbitals, triceps and thoracic and lumbar region   Muscle Mass Depletion: severe depletion of temples, clavicle region, scapular region, interosseous muscle and lower extremities   Weight Loss:38.64% x 1 yr   Fluid Accumulation: mild      Interventions/Recommendations (treatment strategy):   TF Recs place see above    Nutrition Diagnosis Status:  New

## 2018-04-18 NOTE — PROGRESS NOTES
Pt is refusing all medications: Cipro, Prednisone, and KPhos. MD notified. Will continue to monitor.

## 2018-04-18 NOTE — ASSESSMENT & PLAN NOTE
- Patient BS was 20 on EMS arrival. He had full recovery of AMS after glucose administration  - Etiology may be related malnutrition, malignancy, sepsis    - His TSH is low. T4 is normal  - AM cortisol 29.5  - AM glucose 79g/dl  - His BS drops off the D5 drip  - Continue D5 1/2NS at 75 cc/hr  - Continue to monitor POCT glucose every 2 hour   - Consult endocrine, appreciate recs  - Will consider starting prednisone 20mg daily

## 2018-04-18 NOTE — PROGRESS NOTES
Admit Assessment    Patient Identification  Teddy Caballero   :  1965  Admit Date:  4/15/2018  Attending Provider:  Brittany Gaines MD              Referral:   Patient was admitted to Medical Oncology Service with a diagnosis of Lung Cancer with Liver and Kidney Mets., and he was admitted this hospital stay due to AMS, Hypoglycemia.  Altered mental status [R41.82]  Hypoglycemia [E16.2]  Hypoglycemia [E16.2].     Oncology Social Worker is involved. Patient was referred to the Social Work Department via admission list/census.  Patient presents as a 52 y.o. year old male.        Living Situation:    Resides at [facility] [address] [address], phone: [phone] (home).      Patient was previously able to perform ADLs; assistance from family as needed.       Current or Past Agencies and Description of Services/Supplies    DME    Equipment Currently Used at Home: walker, rolling, wheelchair, rollator, hospital bed, bedside commode, shower chair    Home Health      IV Infusion      Nutrition: Oral diet    Outpatient Pharmacy:     [facility] [place] - [address]  [id number]  [facility] [address]  Phone: [phone] Fax: [phone]      Patient Preference of agencies include: none specified.     Patient/Caregiver informed of right to choose providers or agencies.  Patient provides permission to release any necessary information to [facility] and to [facility] agencies as needed to facilitate patient care, treatment planning, and patient discharge planning.  Written and verbal resources will be provided as needed/requested.      Coping  Appropriate to situation.  Family support.          Adjustment to Diagnosis and Treatment  Appropriate; ongoing process.      Emotional/Behavioral/Cognitive Issues            History/Current Symptoms of Anxiety/Depression:     History/Current Substance Use:   Social History     Social  History Main Topics    Smoking status: Current Every Day Smoker     Packs/day: 0.25     Years: 30.00     Types: Cigarettes    Smokeless tobacco: Never Used    Alcohol use No      Comment: pt states he quit 2017    Drug use: No    Sexual activity: Not on file       Indications of Abuse/Neglect: No  Abuse Screen  Do You Feel Unsafe at Home, Work or School?: no    Financial:  Payor/Plan Subscr  Sex Relation Sub. Ins. ID Effective Group Num   1. [employer] - [employer]* JESÚS TOTH 1965 Male  [id number]                                   [address]                    Other identified concerns/needs:    Plan: Patient to return home with family via car.    Interventions/Referrals:    Patient/caregiver engaged in treatment planning process.    Oncology Social Worker providing psychosocial and supportive counseling, resources, education, assistance and discharge planning as appropriate.  Patient/caregiver state understanding of  available resources,  following, remains available. They have contact information for Oncology Social Worker.

## 2018-04-18 NOTE — PLAN OF CARE
Problem: Patient Care Overview  Goal: Plan of Care Review  Patient orientated to person, place, time, situation.  Patient refuses to be turned in bed, educated on pressure ulcer prevention.  Accuchecks q 2.  Patient placed on 2L O2 nasal canula.  No PRN pain meds given.  Bed low and locked.  Siderails X2.  Call bell with in reach.

## 2018-04-18 NOTE — PROGRESS NOTES
04/18/18 1630   Vital Signs   Temp 97.4 °F (36.3 °C)   Temp src Oral   Pulse 89   Heart Rate Source Monitor   Resp 20   SpO2 (!) 72 %   Pulse Oximetry Type Intermittent   O2 Device (Oxygen Therapy) room air   /76   Nurse called to bedside for low O2 sat taken by nursing student. Nurse increased O2 to 6L for O2 sat of 86%. MD notified. Will check O2 sat again in 15 minutes.

## 2018-04-18 NOTE — ASSESSMENT & PLAN NOTE
Patient presented with hypoglycemia and hypothermia. His WBC ~14 K but it has been in this range for weeks. He is not tachycardic. His BP is on the lower side.   - Source could be skin.   - Blood cultures are sent, will follow results.   - CXR with no clear consolidation or effusion.   - UA is clear.   - he received one dose of Zosyn in the ED. Will switch to cefepime 2 g every 12 hrs.   - 4/16: His temp is back to normal. His cultures are NGT.   - Continue cefepime with plans to de-escalate in the near future  - Follow up cultures

## 2018-04-18 NOTE — SUBJECTIVE & OBJECTIVE
Interval History:   Patient seen and examined this AM. Patient reports NAEON. Patient remains on D5 1/2 NS gtt with low BGLs if dc'd. Patient was able to obtain MRI lumbar spine. Patient had no further episodes of confusion. Patient notes continued mild cough, but he denies chest pain, SOB, abdo pain, n/v, dysuria. Evaluated by endocrine. No other issues..      Oncology Treatment Plan:   OP MELANOMA PEMBROLIZUMAB    Medications:  Continuous Infusions:   dextrose 5 % and 0.45 % NaCl 75 mL/hr at 04/18/18 0426     Scheduled Meds:   ceFEPime (MAXIPIME) IVPB  2 g Intravenous Q12H    collagenase   Topical (Top) Daily    morphine  15 mg Oral BID    pantoprazole  40 mg Oral Daily     PRN Meds:ALPRAZolam, dextrose 50%, dextrose 50%, glucagon (human recombinant), glucose, glucose, oxyCODONE-acetaminophen, sodium chloride 0.9%     Review of Systems   Constitutional: Positive for activity change, appetite change and fatigue. Negative for chills and fever.   HENT: Positive for trouble swallowing.    Respiratory: Positive for cough (mild). Negative for shortness of breath.    Cardiovascular: Negative for chest pain and leg swelling.   Gastrointestinal: Negative for abdominal distention, abdominal pain, blood in stool, nausea and vomiting.   Genitourinary: Negative for dysuria and hematuria.   Musculoskeletal: Positive for back pain.   Skin: Positive for wound.   Neurological: Positive for weakness.   Psychiatric/Behavioral: Negative for confusion.     Objective:     Vital Signs (Most Recent):  Temp: 97.5 °F (36.4 °C) (04/18/18 0406)  Pulse: 82 (04/18/18 0406)  Resp: 18 (04/18/18 0406)  BP: 115/81 (04/18/18 0406)  SpO2: 98 % (04/18/18 0406) Vital Signs (24h Range):  Temp:  [97.5 °F (36.4 °C)-98.2 °F (36.8 °C)] 97.5 °F (36.4 °C)  Pulse:  [77-82] 82  Resp:  [18-20] 18  SpO2:  [90 %-99 %] 98 %  BP: ()/(64-81) 115/81     Weight: 39.6 kg (87 lb 4.8 oz)  Body mass index is 12.89 kg/m².  Body surface area is 1.39 meters  squared.      Intake/Output Summary (Last 24 hours) at 04/18/18 0739  Last data filed at 04/18/18 0609   Gross per 24 hour   Intake          1461.25 ml   Output             1475 ml   Net           -13.75 ml       Physical Exam   Constitutional: He is oriented to person, place, and time. He appears cachectic. No distress.   HENT:   Head: Normocephalic and atraumatic.   Eyes: EOM are normal.   Neck: Neck supple. No JVD present.   Cardiovascular: Normal rate, regular rhythm, normal heart sounds and intact distal pulses.    No murmur heard.  He has a port on his anterior chest wall.      Pulmonary/Chest: No respiratory distress. He has no wheezes.   Decreased breath sounds bilaterally.      Abdominal: Soft. He exhibits no distension. There is no tenderness.   Musculoskeletal: He exhibits no edema.   Neurological: He is alert and oriented to person, place, and time. A sensory deficit (feet numbness ) is present.   His motor power on his feet +2 maximum and it is worse on the left side with sever muscle atrophy.   Legs and thighs are +3.   Upper extremities +3.    Skin: Skin is warm. He is not diaphoretic.   He has skin breakdown on his tail bone.    Psychiatric: He has a normal mood and affect.   Vitals reviewed.      Significant Labs:   CBC:     Recent Labs  Lab 04/17/18  0403 04/18/18  0420   WBC 11.53 14.20*   HGB 11.5* 12.3*   HCT 33.1* 35.5*   PLT 69* 71*    and CMP:     Recent Labs  Lab 04/17/18  0403 04/17/18  1120 04/17/18  1809 04/18/18  0420    141 141 139   K 3.6 3.7 3.7 3.8   * 116* 117* 115*   CO2 18* 20* 19* 19*   GLU 70 52* 76 79   BUN 48* 45* 45* 45*   CREATININE 0.5 0.5 0.5 0.5   CALCIUM 7.5* 7.5* 7.5* 7.4*   PROT 4.3*  --   --  4.6*   ALBUMIN 1.9*  --   --  2.0*   BILITOT 0.8  --   --  0.9   ALKPHOS 102  --   --  113   AST 30  --   --  31   ALT 69*  --   --  73*   ANIONGAP 6* 5* 5* 5*   EGFRNONAA >60.0 >60.0 >60.0 >60.0       Diagnostic Results:  MRI: brain/ no metastesis.

## 2018-04-18 NOTE — PLAN OF CARE
Problem: Patient Care Overview  Goal: Plan of Care Review  Outcome: Ongoing (interventions implemented as appropriate)  POC reviewed with patient; understanding verbalized. Pt refused turning. He also refused several of his medications. Accucheck done q2. Pt remains on bedrest. 2L O2 via NC. Voids concentrated urine in the urinal; no noted BM this shift. Full liquid diet with decreased appetite. D51/2NS at 75mL.  Pt. with nonskid footwear on, bed in lowest position, and locked with bed rails up x2.  Pt. has call light and personal items within reach. VSS and afebrile this shift. All questions and concerns address at this time. Will continue to monitor.

## 2018-04-18 NOTE — CONSULTS
"  [facility]  Adult Nutrition  Consult Note    SUMMARY     Recommendations    Recommendation/Intervention: Pt is severely Malnourished. Full Liq not meeting need 2/2 to picky eating habit. Recommend TF. Isosource 1.5 @ 32ml/hr.  Provides 1152 kcals,  52g/Pro, and 598cc free fl. start with 10ml/hr and increase by 10ml q2hrs until goal rate is achieved. hold for residuals >300ml. Meets 100% EEN/EPN  Goals: nutrient intake > 75% EEN  Nutrition Goal Status: new  Communication of RD Recs: reviewed with physician (POC)    Reason for Assessment    Reason for Assessment: consult  Diagnosis:  (Hypoglycemia )  Relevant Medical History: Lung cancer, HLD, HTN, NSTEMI  Interdisciplinary Rounds: attended  General Information Comments: Pt is on a full liquid diet and states  he is a very picky eater. RD made several suggestions and pt denied most suggestions.. Wife at bed side states she is willing to bring in food. RD ok w/ this plan, but stressed full liq diet only.  Nutrition Discharge Planning: Regular soft High Kcal diet w/po intake > 75%    Nutrition Risk Screen    Nutrition Risk Screen: dysphagia or difficulty swallowing    Nutrition/Diet History    Patient Reported Diet/Restrictions/Preferences: general  Food Preferences: limited  Do you have any cultural, spiritual, Hindu conflicts, given your current situation?: none stated   Food Allergies: NKFA  Factors Affecting Nutritional Intake: difficulty/impaired swallowing (extremely picky eater)    Anthropometrics    Temp: 97.5 °F (36.4 °C)  Height Method: Stated  Height: 5' 9.02" (175.3 cm)  Height (inches): 69.02 in  Weight Method: Bed Scale  Weight: 39.6 kg (87 lb 4.8 oz)  Weight (lb): 87.3 lb  Ideal Body Weight (IBW), Male: 160.12 lb  % Ideal Body Weight, Male (lb): 54.52 lb  BMI (Calculated): 12.9  BMI Grade: less than 16 protein-energy malnutrition grade III  Weight Loss: unintentional  Usual Body Weight (UBW), k.54 kg  Weight Change Amount: " 54 lb 15.7 oz  % Usual Body Weight: 61.49  % Weight Change From Usual Weight: -38.64 %       Lab/Procedures/Meds    Pertinent Labs Reviewed: reviewed  Lab Results   Component Value Date    WBC 14.20 (H) 04/18/2018    HGB 12.3 (L) 04/18/2018    HCT 35.5 (L) 04/18/2018    PLT 71 (L) 04/18/2018       (H) 04/18/2018    CO2 19 (L) 04/18/2018    BUN 45 (H) 04/18/2018    CALCIUM 7.4 (L) 04/18/2018    ANIONGAP 5 (L) 04/18/2018      ALBUMIN 2.0 (L) 04/18/2018        GLU-79    Pertinent Medications Reviewed: reviewed   ceFEPime (MAXIPIME) IVPB  2 g Intravenous Q12H    collagenase   Topical (Top) Daily    morphine  15 mg Oral BID    pantoprazole  40 mg Oral Daily    potassium, sodium phosphates  2 packet Oral Q4H      dextrose 5 % and 0.45 % NaCl 75 mL/hr at 04/18/18 0426         Physical Findings/Assessment    Overall Physical Appearance: brittle hair, advanced age, generalized wasting, lethargic, loss of muscle mass, loss of subcutaneous fat, underweight, weak  Oral/Mouth Cavity: WDL  Skin: intact    Estimated/Assessed Needs    Weight Used For Calorie Calculations: 39.6 kg (87 lb 4.8 oz)  Energy Calorie Requirements (kcal): 990-1188  Energy Need Method: Kcal/kg  Protein Requirements: 48-56  Weight Used For Protein Calculations: 39.6 kg (87 lb 4.8 oz)  Fluid Requirements (mL): Per MD  Fluid Need Method: RDA Method  RDA Method (mL): 990         Nutrition Prescription Ordered    Current Diet Order: Full Liq  Oral Nutrition Supplement: Denies    Evaluation of Received Nutrient/Fluid Intake    Other Calories (kcal): 306  IV Fluid (mL): 1800  I/O: +1.98L since admit    Intake/Output Summary (Last 24 hours) at 04/18/18 0750  Last data filed at 04/18/18 0609   Gross per 24 hour   Intake          1461.25 ml   Output             1475 ml   Net           -13.75 ml       Energy Calories Required: not meeting needs  Protein Required: not meeting needs  Fluid Required: not meeting needs  Comments: LBM:4/14/18  % Intake of  Estimated Energy Needs: 0 - 25 %  % Meal Intake: 0 - 25 %    Nutrition Risk    Level of Risk/Frequency of Follow-up: high     Assessment and Plan    Severe malnutrition    Malnutrition in the context of Chronic Illness/Injury and Social/Environmental Circumstances     Related to (etiology):  Lung cancer and picky eater- refused all RD suggestions    Signs and Symptoms (as evidenced by):  Energy Intake: <50% of estimated energy requirement for 1yr  Body Fat Depletion: severe depletion of orbital's, triceps and thoracic and lumbar region   Muscle Mass Depletion: severe depletion of temples, clavicle region, scapular region, interosseous muscle and lower extremities   Weight Loss:38.64% x 1 yr   Fluid Accumulation: mild      Interventions/Recommendations (treatment strategy):   TF Recs place see above    Nutrition Diagnosis Status:  New                 Monitor and Evaluation    Food and Nutrient Intake: energy intake, food and beverage intake, enteral nutrition intake  Food and Nutrient Adminstration: diet order, enteral and parenteral nutrition administration  Knowledge/Beliefs/Attitudes: food and nutrition knowledge/skill  Physical Activity and Function: nutrition-related ADL and IADLs  Anthropometric Measurements: weight, weight change  Biochemical Data, Medical Tests and Procedures:  (All labs)  Nutrition-Focused Physical Findings: overall appearance     Nutrition Follow-Up    RD Follow-up?: Yes

## 2018-04-18 NOTE — PROGRESS NOTES
"[facility]  Hematology/Oncology  Progress Note    Patient Name: Teddy Caballero  Admission Date: 4/15/2018  Hospital Length of Stay: 2 days  Code Status: Full Code     Subjective:     HPI:  52 year old male with a history of lung cancer with metastasis to kidney and liver on Kytruda. He presented to the ED via EMS with AMS.   Over the past week he became more lethargic and weak. He became bedridden. He used to walk around with a walker. He reports decreased PO intake. On the morning of admission he was unresponsive according to his sister. He was opening his eyes and starring. She called EMS. On their arrival his BS was 21.  The patient was given 1 amp of D50 and spontaneously returned to his baseline and was conversant. He has a small bedsore on his tailbone area about a quarter in size. Her also reports large amount of foul smelling BM over the past few days.    He denies fever, chills, cough, nausea, vomiting or dysuria.   He got his 1st chemotherapy 3 weeks ago.     Oncologic history per last clinic note:   52 year old male to clinic for follow-up after recent biopsy with Dr. Nicholson. Pt referred from GI after evaluation for abd pain revealed what appeared to be metastatic disease throughout the abdomen, including adrenal, renal mass, LNs, and questionable liver met.       Bx c/w NSCLC - [facility] - [id number] showed no actionable mutations.  Awaiting PD-L1 status.       Notes 30 lb wt loss over the last few months, including 10 lbs in the last week or so.  Notes abdominal pain, constipation, nausea, etc.       PS = 1-2      2/23/18- CT Abdomen/Pelvis "Large bilateral adrenal masses with mesenteric lymphadenopathy highly concerning for metastatic disease. Bilateral hypodense renal lesions which appear to enhance highly concerning for neoplasm such as renal cell carcinoma. Consider further evaluation with CT of the abdomen with and without contrast renal mass protocol.    2 small ill-defined " "hepatic hypodensities which are not cystic and do not have the enhancing pattern of a hemangioma highly concerning for metastatic disease."     2/26/18- CT Chest " Spiculated left upper lobe mass measuring up to 6.7 cm concerning for primary malignancy.Supraclavicular and mediastinal lymphadenopathy suggestive of metastases.Bilateral adrenal masses and ill-defined hypodense hepatic and renal lesions are suggestive of metastatic disease, better visualized on recent CT abdomen pelvis of the 2/23/2018."    Interval History:   Patient seen and examined this AM. Patient reports NAEON. Patient remains on D5 1/2 NS gtt with low BGLs if dc'd. Patient was able to obtain MRI lumbar spine. Patient had no further episodes of confusion. Patient notes continued mild cough, but he denies chest pain, SOB, abdo pain, n/v, dysuria. Evaluated by endocrine. No other issues..      Oncology Treatment Plan:   OP MELANOMA PEMBROLIZUMAB    Medications:  Continuous Infusions:   dextrose 5 % and 0.45 % NaCl 75 mL/hr at 04/18/18 0426     Scheduled Meds:   ceFEPime (MAXIPIME) IVPB  2 g Intravenous Q12H    collagenase   Topical (Top) Daily    morphine  15 mg Oral BID    pantoprazole  40 mg Oral Daily     PRN Meds:ALPRAZolam, dextrose 50%, dextrose 50%, glucagon (human recombinant), glucose, glucose, oxyCODONE-acetaminophen, sodium chloride 0.9%     Review of Systems   Constitutional: Positive for activity change, appetite change and fatigue. Negative for chills and fever.   HENT: Positive for trouble swallowing.    Respiratory: Positive for cough (mild). Negative for shortness of breath.    Cardiovascular: Negative for chest pain and leg swelling.   Gastrointestinal: Negative for abdominal distention, abdominal pain, blood in stool, nausea and vomiting.   Genitourinary: Negative for dysuria and hematuria.   Musculoskeletal: Positive for back pain.   Skin: Positive for wound.   Neurological: Positive for weakness.   Psychiatric/Behavioral: " Negative for confusion.     Objective:     Vital Signs (Most Recent):  Temp: 97.5 °F (36.4 °C) (04/18/18 0406)  Pulse: 82 (04/18/18 0406)  Resp: 18 (04/18/18 0406)  BP: 115/81 (04/18/18 0406)  SpO2: 98 % (04/18/18 0406) Vital Signs (24h Range):  Temp:  [97.5 °F (36.4 °C)-98.2 °F (36.8 °C)] 97.5 °F (36.4 °C)  Pulse:  [77-82] 82  Resp:  [18-20] 18  SpO2:  [90 %-99 %] 98 %  BP: ()/(64-81) 115/81     Weight: 39.6 kg (87 lb 4.8 oz)  Body mass index is 12.89 kg/m².  Body surface area is 1.39 meters squared.      Intake/Output Summary (Last 24 hours) at 04/18/18 0739  Last data filed at 04/18/18 0609   Gross per 24 hour   Intake          1461.25 ml   Output             1475 ml   Net           -13.75 ml       Physical Exam   Constitutional: He is oriented to person, place, and time. He appears cachectic. No distress.   HENT:   Head: Normocephalic and atraumatic.   Eyes: EOM are normal.   Neck: Neck supple. No JVD present.   Cardiovascular: Normal rate, regular rhythm, normal heart sounds and intact distal pulses.    No murmur heard.  He has a port on his anterior chest wall.      Pulmonary/Chest: No respiratory distress. He has no wheezes.   Decreased breath sounds bilaterally.      Abdominal: Soft. He exhibits no distension. There is no tenderness.   Musculoskeletal: He exhibits no edema.   Neurological: He is alert and oriented to person, place, and time. A sensory deficit (feet numbness ) is present.   His motor power on his feet +2 maximum and it is worse on the left side with sever muscle atrophy.   Legs and thighs are +3.   Upper extremities +3.    Skin: Skin is warm. He is not diaphoretic.   He has skin breakdown on his tail bone.    Psychiatric: He has a normal mood and affect.   Vitals reviewed.      Significant Labs:   CBC:     Recent Labs  Lab 04/17/18  0403 04/18/18  0420   WBC 11.53 14.20*   HGB 11.5* 12.3*   HCT 33.1* 35.5*   PLT 69* 71*    and CMP:     Recent Labs  Lab 04/17/18  0403 04/17/18  1120  04/17/18  1809 04/18/18  0420    141 141 139   K 3.6 3.7 3.7 3.8   * 116* 117* 115*   CO2 18* 20* 19* 19*   GLU 70 52* 76 79   BUN 48* 45* 45* 45*   CREATININE 0.5 0.5 0.5 0.5   CALCIUM 7.5* 7.5* 7.5* 7.4*   PROT 4.3*  --   --  4.6*   ALBUMIN 1.9*  --   --  2.0*   BILITOT 0.8  --   --  0.9   ALKPHOS 102  --   --  113   AST 30  --   --  31   ALT 69*  --   --  73*   ANIONGAP 6* 5* 5* 5*   EGFRNONAA >60.0 >60.0 >60.0 >60.0       Diagnostic Results:  MRI: brain/ no metastesis.     Assessment/Plan:     * Hypoglycemia    - Patient BS was 20 on EMS arrival. He had full recovery of AMS after glucose administration  - Etiology may be related malnutrition, malignancy, sepsis    - His TSH is low. T4 is normal  - AM cortisol 29.5  - AM glucose 79g/dl  - His BS drops off the D5 drip  - Continue D5 1/2NS at 75 cc/hr  - Continue to monitor POCT glucose every 2 hour   - Consult endocrine, appreciate recs  - Will consider starting prednisone 20mg daily        Weakness    He has generalized weakness related to his sever malnutrition state. His weakness is more prominent in the lower extremities.   - Will consider imaging his spine looking for mets.         Severe malnutrition    He has trouble swallowing anything but liquids.   - Nutrition is consulted, appreciate recs  - SLP consulted, appreciate recs        Metastatic lung cancer (metastasis from lung to other site)    Patient with recent diagnosis of lung cancer. He started Keytruda therapy 3 weeks ago. He follows with Dr. Meadows.   - for more details see HPI.         Sepsis    Patient presented with hypoglycemia and hypothermia. His WBC ~14 K but it has been in this range for weeks. He is not tachycardic. His BP is on the lower side.   - Source could be skin.   - Blood cultures are sent, will follow results.   - CXR with no clear consolidation or effusion.   - UA is clear.   - he received one dose of Zosyn in the ED. Will switch to cefepime 2 g every 12 hrs.   -  4/16: His temp is back to normal. His cultures are NGT.   - Continue cefepime with plans to de-escalate in the near future  - Follow up cultures        Decubitus ulcer of sacral region    - small sacral bed sore  - poor wound healing due to malnutrition  - wound care consulted, appreciate recs        Thrombocytopenia    - unclear etiology but appears to have stablized (71 today)  - not DIC as fibrinogen normal  - no evidence of MAHA  - continue to monitor          DISPO: pending clinical improvement    Jovi Ken MD (PGY-5)  Hematology/Oncology Fellow  Will discuss with Dr. Gaines (Hematology/Oncology Staff)    Attending Note  I have personally taken the history and examined this patient and agree with the fellow's note as stated above.  We had a ponce talk about prognosis and that palliative may be our only option here   We recommend a  consult for palliative discussion  Wife and patient have not discussed end of life wishes

## 2018-04-18 NOTE — PLAN OF CARE
Problem: Patient Care Overview  Goal: Plan of Care Review  Outcome: Ongoing (interventions implemented as appropriate)   04/18/18 2148   Coping/Psychosocial   Plan Of Care Reviewed With patient;spouse   Recommendations     Recommendation/Intervention: Pt is severely Malnourished. Full Liq not meeting need 2/2 to picky eating habit. Recommend TF. Isosource 1.5 @ 32ml/hr.  Provides 1152 kcals,  52g/Pro, and 598cc free fl. start with 10ml/hr and increase by 10ml q2hrs until goal rate is achieved. hold for residuals >300ml. Meets 100% EEN/EPN  Goals: nutrient intake > 75% EEN  Nutrition Goal Status: new  Communication of RD Recs: reviewed with physician (POC)    Assessment and Plan         Severe malnutrition     Malnutrition in the context of Chronic Illness/Injury and Social/Environmental Circumstances      Related to (etiology):  Lung cancer and picky eater- refused all RD suggestions     Signs and Symptoms (as evidenced by):  Energy Intake: <50% of estimated energy requirement for 1yr  Body Fat Depletion: severe depletion of orbital's, triceps and thoracic and lumbar region   Muscle Mass Depletion: severe depletion of temples, clavicle region, scapular region, interosseous muscle and lower extremities   Weight Loss:38.64% x 1 yr   Fluid Accumulation: mild

## 2018-04-19 PROBLEM — R41.82 ALTERED MENTAL STATUS: Status: RESOLVED | Noted: 2018-01-01 | Resolved: 2018-01-01

## 2018-04-19 NOTE — PT/OT/SLP PROGRESS
Physical Therapy      Patient Name:  Teddy Caballero   MRN:  [id number]    Attempted to visit pt in AM (1199 - 5322) Pt agreeable to attempt bed mobility c therapy but MD entered and request to talk to pt.  Following discussion c pt MD notified therapist that MD will discontinue PT/OT orders as pt is pursuing palliative care.     Moustapha Calloway, PT   4/19/2018

## 2018-04-19 NOTE — PLAN OF CARE
Problem: Patient Care Overview  Goal: Plan of Care Review  Outcome: Ongoing (interventions implemented as appropriate)  Pt remained free from falls during shift. AAOx4. Temperature and BP periodically unreadable. Stating 88% on 6L. Q4 neuro checks. Q2 BG, BG running 58-61. Pt is DNR. D5NS running at 100mL/hr. Pt's family arriving this evening, and tomorrow morning, prior to putting pt on comfort care. Bed locked and in lowest position. Call light within reach. Bed rails up x2. Pt no longer participating in care. Family at bedside. Will continue to monitor.

## 2018-04-19 NOTE — ASSESSMENT & PLAN NOTE
Patient presented with hypoglycemia and hypothermia. His WBC ~14 K but it has been in this range for weeks. He is not tachycardic. His BP is on the lower side.   - Source could be skin.   - Blood cultures are sent, will follow results.   - CXR with no clear consolidation or effusion.   - UA is clear.   - he received one dose of Zosyn in the ED. Will switch to cefepime 2 g every 12 hrs.   - 4/16: His temp is back to normal. His cultures are NGT.   - 4/19: will d/c cipro as we are transitioning to comfort care.

## 2018-04-19 NOTE — ASSESSMENT & PLAN NOTE
- Patient BS was 20 on EMS arrival. He had full recovery of AMS after glucose administration  - Etiology may be related malnutrition, malignancy, sepsis    - His TSH is low. T4 is normal  - AM cortisol 29.5  - AM glucose 79g/dl  - increased his D5 1/2NS to 100cc/hr  - We are unable to wean him off the drip.   - He couldn't tolerate the steroids.   - Continue to monitor POCT glucose every 2 hour   -

## 2018-04-19 NOTE — PT/OT/SLP PROGRESS
Occupational Therapy  Discharge Summary      Patient Name:  Teddy Caballero   MRN:  [id number]    Patient attempted to be seen in AM ([id number]) with PT and spouse present. Pt agreeable to therapy session and willing to attempt OOB mobility. MDs arrived and requested to speak to pt and spouse privately. Following discussion with MDs, pt and spouse, family wishing to purse palliative care. MD informed therapist to discontinued OT/PT orders at this time. OT acknowledged request. OT orders discontinued 4/19/18. No billable units this date.     Christi Barillas OT  4/19/2018

## 2018-04-19 NOTE — PLAN OF CARE
Problem: Patient Care Overview  Goal: Plan of Care Review  Outcome: Ongoing (interventions implemented as appropriate)  Afebrile. Free from falls or injury. No complaints of pain. D51/2NS infusing at 75ml/hr. Accu checks q2 hours. Dextrose given once for CBG 59. Sats % on 6L of O2 NC.  Bed locked in lowest position, non skid socks on, call light within reach. Pt instructed to call if any assistance is needed. Vitals stable. Wife at bedside. Will cont to jina pt.

## 2018-04-19 NOTE — PROGRESS NOTES
"[facility]  Hematology/Oncology  Progress Note    Patient Name: Teddy Caballero  Admission Date: 4/15/2018  Hospital Length of Stay: 3 days  Code Status: DNR     Subjective:     HPI:  52 year old male with a history of lung cancer with metastasis to kidney and liver on Kytruda. He presented to the ED via EMS with AMS.   Over the past week he became more lethargic and weak. He became bedridden. He used to walk around with a walker. He reports decreased PO intake. On the morning of admission he was unresponsive according to his sister. He was opening his eyes and starring. She called EMS. On their arrival his BS was 21.  The patient was given 1 amp of D50 and spontaneously returned to his baseline and was conversant. He has a small bedsore on his tailbone area about a quarter in size. Her also reports large amount of foul smelling BM over the past few days.    He denies fever, chills, cough, nausea, vomiting or dysuria.   He got his 1st chemotherapy 3 weeks ago.     Oncologic history per last clinic note:   52 year old male to clinic for follow-up after recent biopsy with Dr. Nicholson. Pt referred from GI after evaluation for abd pain revealed what appeared to be metastatic disease throughout the abdomen, including adrenal, renal mass, LNs, and questionable liver met.       Bx c/w NSCLC - Adeno - [id number] showed no actionable mutations.  Awaiting PD-L1 status.       Notes 30 lb wt loss over the last few months, including 10 lbs in the last week or so.  Notes abdominal pain, constipation, nausea, etc.       PS = 1-2      2/23/18- CT Abdomen/Pelvis "Large bilateral adrenal masses with mesenteric lymphadenopathy highly concerning for metastatic disease. Bilateral hypodense renal lesions which appear to enhance highly concerning for neoplasm such as renal cell carcinoma. Consider further evaluation with CT of the abdomen with and without contrast renal mass protocol.    2 small ill-defined hepatic " "hypodensities which are not cystic and do not have the enhancing pattern of a hemangioma highly concerning for metastatic disease."     2/26/18- CT Chest " Spiculated left upper lobe mass measuring up to 6.7 cm concerning for primary malignancy.Supraclavicular and mediastinal lymphadenopathy suggestive of metastases.Bilateral adrenal masses and ill-defined hypodense hepatic and renal lesions are suggestive of metastatic disease, better visualized on recent CT abdomen pelvis of the 2/23/2018."    Interval History: patient is more tired looking and still on D5 drip. Will transition to comfort care measures once his family is around.     Oncology Treatment Plan:   OP MELANOMA PEMBROLIZUMAB    Medications:  Continuous Infusions:   dextrose 5 % and 0.45 % NaCl 100 mL/hr at 04/19/18 1125     Scheduled Meds:   collagenase   Topical (Top) Daily    morphine  15 mg Oral BID    pantoprazole  40 mg Oral Daily     PRN Meds:albuterol-ipratropium 2.5mg-0.5mg/3mL, ALPRAZolam, dextrose 50%, dextrose 50%, glucagon (human recombinant), glucose, glucose, oxyCODONE-acetaminophen, sodium chloride 0.9%     Review of Systems   Constitutional: Positive for activity change, appetite change and fatigue. Negative for chills and fever.   HENT: Positive for trouble swallowing.    Respiratory: Positive for cough (mild). Negative for shortness of breath.    Cardiovascular: Negative for chest pain and leg swelling.   Gastrointestinal: Negative for abdominal distention, abdominal pain, blood in stool, nausea and vomiting.   Genitourinary: Negative for dysuria and hematuria.   Musculoskeletal: Positive for back pain.   Skin: Positive for wound.   Neurological: Positive for weakness.   Psychiatric/Behavioral: Negative for confusion.     Objective:     Vital Signs (Most Recent):  Temp: 98.1 °F (36.7 °C) (04/19/18 0737)  Pulse: 72 (04/19/18 0737)  Resp: 16 (04/19/18 0737)  BP: 110/76 (04/19/18 1100)  SpO2: 97 % (04/19/18 1000) Vital Signs (24h " Range):  Temp:  [97.4 °F (36.3 °C)-98.1 °F (36.7 °C)] 98.1 °F (36.7 °C)  Pulse:  [72-89] 72  Resp:  [16-20] 16  SpO2:  [72 %-100 %] 97 %  BP: ()/(66-77) 110/76     Weight: 39.6 kg (87 lb 4.8 oz)  Body mass index is 12.89 kg/m².  Body surface area is 1.39 meters squared.      Intake/Output Summary (Last 24 hours) at 04/19/18 1356  Last data filed at 04/19/18 1112   Gross per 24 hour   Intake             1171 ml   Output              700 ml   Net              471 ml       Physical Exam   Constitutional: He is oriented to person, place, and time. He appears cachectic. No distress.   HENT:   Head: Normocephalic and atraumatic.   Eyes: EOM are normal.   Neck: Neck supple. No JVD present.   Cardiovascular: Normal rate, regular rhythm, normal heart sounds and intact distal pulses.    No murmur heard.  He has a port on his anterior chest wall.      Pulmonary/Chest: No respiratory distress. He has no wheezes.   Decreased breath sounds bilaterally.      Abdominal: Soft. He exhibits no distension. There is no tenderness.   Musculoskeletal: He exhibits no edema.   Neurological: He is alert and oriented to person, place, and time. A sensory deficit (feet numbness ) is present.   His motor power on his feet +2 maximum and it is worse on the left side with sever muscle atrophy.   Legs and thighs are +3.   Upper extremities +3.    Skin: Skin is warm. He is not diaphoretic.   He has skin breakdown on his tail bone.    Psychiatric: He has a normal mood and affect.   Vitals reviewed.      Significant Labs:   CBC:   Recent Labs  Lab 04/18/18  0420 04/19/18  0508   WBC 14.20* 12.12   HGB 12.3* 13.0*   HCT 35.5* 38.0*   PLT 71* 60*    and CMP:   Recent Labs  Lab 04/17/18  1809 04/18/18  0420 04/19/18  0508    139 140   K 3.7 3.8 4.0   * 115* 115*   CO2 19* 19* 20*   GLU 76 79 91   BUN 45* 45* 47*   CREATININE 0.5 0.5 0.5   CALCIUM 7.5* 7.4* 7.3*   PROT  --  4.6* 4.4*   ALBUMIN  --  2.0* 1.8*   BILITOT  --  0.9 0.8    ALKPHOS  --  113 105   AST  --  31 29   ALT  --  73* 78*   ANIONGAP 5* 5* 5*   EGFRNONAA >60.0 >60.0 >60.0       Diagnostic Results:  MRI: spine. no spinal stenosis    Assessment/Plan:     * Hypoglycemia    - Patient BS was 20 on EMS arrival. He had full recovery of AMS after glucose administration  - Etiology may be related malnutrition, malignancy, sepsis    - His TSH is low. T4 is normal  - AM cortisol 29.5  - AM glucose 79g/dl  - increased his D5 1/2NS to 100cc/hr  - We are unable to wean him off the drip.   - He couldn't tolerate the steroids.   - Continue to monitor POCT glucose every 2 hour   -           Sepsis    Patient presented with hypoglycemia and hypothermia. His WBC ~14 K but it has been in this range for weeks. He is not tachycardic. His BP is on the lower side.   - Source could be skin.   - Blood cultures are sent, will follow results.   - CXR with no clear consolidation or effusion.   - UA is clear.   - he received one dose of Zosyn in the ED. Will switch to cefepime 2 g every 12 hrs.   - 4/16: His temp is back to normal. His cultures are NGT.   - 4/19: will d/c cipro as we are transitioning to comfort care.             Weakness    He has generalized weakness related to his sever malnutrition state. His weakness is more prominent in the lower extremities.   - MRI of the spine is not consistent with any cause of weakness.         Metastatic lung cancer (metastasis from lung to other site)    Patient with recent diagnosis of lung cancer. He started Keytruda therapy 3 weeks ago. He follows with Dr. Meadows.   - for more details see HPI.         Severe malnutrition    He has trouble swallowing anything but liquids.   - Nutrition is consulted, appreciate recs  - He passed SLP evaluation but still choking on his medications.         Decubitus ulcer of sacral region    - small sacral bed sore  - poor wound healing due to malnutrition  - wound care consulted, appreciate recs            Marcelo Mills,  MD  Hematology/Oncology  [facility]      Attending Note  I have personally taken the history and examined this patient and agree with the fellow's note as stated above.  Palliative recommended  They will notify family and we will make decisions re: D5 drip

## 2018-04-19 NOTE — SUBJECTIVE & OBJECTIVE
Interval History: patient is more tired looking and still on D5 drip. Will transition to comfort care measures once his family is around.     Oncology Treatment Plan:   OP MELANOMA PEMBROLIZUMAB    Medications:  Continuous Infusions:   dextrose 5 % and 0.45 % NaCl 100 mL/hr at 04/19/18 1125     Scheduled Meds:   collagenase   Topical (Top) Daily    morphine  15 mg Oral BID    pantoprazole  40 mg Oral Daily     PRN Meds:albuterol-ipratropium 2.5mg-0.5mg/3mL, ALPRAZolam, dextrose 50%, dextrose 50%, glucagon (human recombinant), glucose, glucose, oxyCODONE-acetaminophen, sodium chloride 0.9%     Review of Systems   Constitutional: Positive for activity change, appetite change and fatigue. Negative for chills and fever.   HENT: Positive for trouble swallowing.    Respiratory: Positive for cough (mild). Negative for shortness of breath.    Cardiovascular: Negative for chest pain and leg swelling.   Gastrointestinal: Negative for abdominal distention, abdominal pain, blood in stool, nausea and vomiting.   Genitourinary: Negative for dysuria and hematuria.   Musculoskeletal: Positive for back pain.   Skin: Positive for wound.   Neurological: Positive for weakness.   Psychiatric/Behavioral: Negative for confusion.     Objective:     Vital Signs (Most Recent):  Temp: 98.1 °F (36.7 °C) (04/19/18 0737)  Pulse: 72 (04/19/18 0737)  Resp: 16 (04/19/18 0737)  BP: 110/76 (04/19/18 1100)  SpO2: 97 % (04/19/18 1000) Vital Signs (24h Range):  Temp:  [97.4 °F (36.3 °C)-98.1 °F (36.7 °C)] 98.1 °F (36.7 °C)  Pulse:  [72-89] 72  Resp:  [16-20] 16  SpO2:  [72 %-100 %] 97 %  BP: ()/(66-77) 110/76     Weight: 39.6 kg (87 lb 4.8 oz)  Body mass index is 12.89 kg/m².  Body surface area is 1.39 meters squared.      Intake/Output Summary (Last 24 hours) at 04/19/18 1356  Last data filed at 04/19/18 1112   Gross per 24 hour   Intake             1171 ml   Output              700 ml   Net              471 ml       Physical Exam    Constitutional: He is oriented to person, place, and time. He appears cachectic. No distress.   HENT:   Head: Normocephalic and atraumatic.   Eyes: EOM are normal.   Neck: Neck supple. No JVD present.   Cardiovascular: Normal rate, regular rhythm, normal heart sounds and intact distal pulses.    No murmur heard.  He has a port on his anterior chest wall.      Pulmonary/Chest: No respiratory distress. He has no wheezes.   Decreased breath sounds bilaterally.      Abdominal: Soft. He exhibits no distension. There is no tenderness.   Musculoskeletal: He exhibits no edema.   Neurological: He is alert and oriented to person, place, and time. A sensory deficit (feet numbness ) is present.   His motor power on his feet +2 maximum and it is worse on the left side with sever muscle atrophy.   Legs and thighs are +3.   Upper extremities +3.    Skin: Skin is warm. He is not diaphoretic.   He has skin breakdown on his tail bone.    Psychiatric: He has a normal mood and affect.   Vitals reviewed.      Significant Labs:   CBC:   Recent Labs  Lab 04/18/18  0420 04/19/18  0508   WBC 14.20* 12.12   HGB 12.3* 13.0*   HCT 35.5* 38.0*   PLT 71* 60*    and CMP:   Recent Labs  Lab 04/17/18  1809 04/18/18  0420 04/19/18  0508    139 140   K 3.7 3.8 4.0   * 115* 115*   CO2 19* 19* 20*   GLU 76 79 91   BUN 45* 45* 47*   CREATININE 0.5 0.5 0.5   CALCIUM 7.5* 7.4* 7.3*   PROT  --  4.6* 4.4*   ALBUMIN  --  2.0* 1.8*   BILITOT  --  0.9 0.8   ALKPHOS  --  113 105   AST  --  31 29   ALT  --  73* 78*   ANIONGAP 5* 5* 5*   EGFRNONAA >60.0 >60.0 >60.0       Diagnostic Results:  MRI: spine. no spinal stenosis

## 2018-04-19 NOTE — ASSESSMENT & PLAN NOTE
He has trouble swallowing anything but liquids.   - Nutrition is consulted, appreciate recs  - He passed SLP evaluation but still choking on his medications.

## 2018-04-19 NOTE — ASSESSMENT & PLAN NOTE
He has generalized weakness related to his sever malnutrition state. His weakness is more prominent in the lower extremities.   - MRI of the spine is not consistent with any cause of weakness.

## 2018-04-19 NOTE — PT/OT/SLP DISCHARGE
Physical Therapy Discharge Summary    Name: Teddy Caballero  MRN: [id number]   Principal Problem: Hypoglycemia     Patient Discharged from acute Physical Therapy on 2018.  Please refer to prior PT noted date on 2018 for functional status.     Assessment:     Pt discharged from acute skilled PT services per MD request as pt is pursuing palliative care.    Objective:     GOALS:    Physical Therapy Goals        Problem: Physical Therapy Goal    Goal Priority Disciplines Outcome Goal Variances Interventions   Physical Therapy Goal     PT/OT, PT Ongoing (interventions implemented as appropriate)     Description:  Goals to be met by: 18     Patient will increase functional independence with mobility by performin. Supine to sit with Moderate Assistance  2. Sit to supine with Moderate Assistance  3. Sit to stand transfer with Moderate Assistance  4. Bed to chair transfer with Moderate Assistance using AD as needed  5. Lower extremity exercise program x15 reps, with assistance as needed, in order to increase LE strength and (I) with functional mobility.     *Gait goal to be implemented as appropriate                      Reasons for Discontinuation of Therapy Services  Transfer to alternate level of care.      Plan:     Patient Discharged to: Palliative Care/Hospice.    Moustapha Calloway, PT  2018

## 2018-04-20 NOTE — PLAN OF CARE
Problem: Patient Care Overview  Goal: Plan of Care Review  Outcome: Ongoing (interventions implemented as appropriate)  Pt AAOx4, on bedrest, involved in plan of care and communicating. Hypotensive and afebrile throughout shift. No complaints of pain. Minimal oral intake, no complaints of N/V. Reduced urine output. Pressure ulcer noted to sacrum. Neuro checks maintained. No acute events so far this shift. Pt remaining free from falls or injury. Bed in lowest locked position, side rails up x2, call light w/i reach, pt instructed to call for assistance if needed. Heel protectors and skid proof socks on. Care plan explained to patient. No additional complaints at this time. Q 2hr rounding performed. Will continue to monitor.

## 2018-04-20 NOTE — PROGRESS NOTES
Visited patient's room this afternoon. Patient was resting in bed, alert, and minimally conversant. His wife and sister were present. Provided support and empathic listening. Stepped out of the room to inform patient's nurse that patient had to have a BM and needed assistance. His wife remained in the room but his sister waited in the hallway. Talked with her until two other visitors arrived and sister went to the vending machines to get a drink. Informed other visitors that patient's nurse was helping and cleaning patient, and they opted to remain in the hallway until nursing staff were finished and they could go in. Comfort care measures initiated, D5 drip discontinued, patient is receiving morphine prn, and his code status is DNR. Will continue to follow.

## 2018-04-20 NOTE — ASSESSMENT & PLAN NOTE
- Patient BS was 20 on EMS arrival. He had full recovery of AMS after glucose administration  - Etiology may be related malnutrition, malignancy, sepsis    - His TSH is low. T4 is normal  - AM cortisol 29.5  - AM glucose 79g/dl  - increased his D5 1/2NS to 100cc/hr  - We are unable to wean him off the drip.   - He couldn't tolerate the steroids.   - Will dc the drip once the family is ready.

## 2018-04-20 NOTE — PROGRESS NOTES
"[facility]  Hematology/Oncology  Progress Note    Patient Name: Teddy Caballero  Admission Date: 4/15/2018  Hospital Length of Stay: 4 days  Code Status: DNR     Subjective:     HPI:  52 year old male with a history of lung cancer with metastasis to kidney and liver on Kytruda. He presented to the ED via EMS with AMS.   Over the past week he became more lethargic and weak. He became bedridden. He used to walk around with a walker. He reports decreased PO intake. On the morning of admission he was unresponsive according to his sister. He was opening his eyes and starring. She called EMS. On their arrival his BS was 21.  The patient was given 1 amp of D50 and spontaneously returned to his baseline and was conversant. He has a small bedsore on his tailbone area about a quarter in size. Her also reports large amount of foul smelling BM over the past few days.    He denies fever, chills, cough, nausea, vomiting or dysuria.   He got his 1st chemotherapy 3 weeks ago.     Oncologic history per last clinic note:   52 year old male to clinic for follow-up after recent biopsy with Dr. Nicholson. Pt referred from GI after evaluation for abd pain revealed what appeared to be metastatic disease throughout the abdomen, including adrenal, renal mass, LNs, and questionable liver met.       Bx c/w NSCLC - Adeno - [id number] showed no actionable mutations.  Awaiting PD-L1 status.       Notes 30 lb wt loss over the last few months, including 10 lbs in the last week or so.  Notes abdominal pain, constipation, nausea, etc.       PS = 1-2      2/23/18- CT Abdomen/Pelvis "Large bilateral adrenal masses with mesenteric lymphadenopathy highly concerning for metastatic disease. Bilateral hypodense renal lesions which appear to enhance highly concerning for neoplasm such as renal cell carcinoma. Consider further evaluation with CT of the abdomen with and without contrast renal mass protocol.    2 small ill-defined hepatic " "hypodensities which are not cystic and do not have the enhancing pattern of a hemangioma highly concerning for metastatic disease."     2/26/18- CT Chest " Spiculated left upper lobe mass measuring up to 6.7 cm concerning for primary malignancy.Supraclavicular and mediastinal lymphadenopathy suggestive of metastases.Bilateral adrenal masses and ill-defined hypodense hepatic and renal lesions are suggestive of metastatic disease, better visualized on recent CT abdomen pelvis of the 2/23/2018."    Interval History: patient is more tired looking and still on D5 drip. Will transition to comfort care and dc the drip once his family is around.     Oncology Treatment Plan:   OP MELANOMA PEMBROLIZUMAB    Medications:  Continuous Infusions:   dextrose 5 % and 0.45 % NaCl 100 mL/hr at 04/20/18 0635     Scheduled Meds:   collagenase   Topical (Top) Daily    morphine  15 mg Oral BID    pantoprazole  40 mg Oral Daily     PRN Meds:albuterol-ipratropium 2.5mg-0.5mg/3mL, ALPRAZolam, dextrose 50%, dextrose 50%, glucagon (human recombinant), glucose, glucose, oxyCODONE-acetaminophen, sodium chloride 0.9%     Review of Systems   Constitutional: Positive for activity change, appetite change and fatigue. Negative for chills and fever.   HENT: Positive for trouble swallowing.    Respiratory: Positive for cough (mild). Negative for shortness of breath.    Cardiovascular: Negative for chest pain and leg swelling.   Gastrointestinal: Negative for abdominal distention, abdominal pain, blood in stool, nausea and vomiting.   Genitourinary: Negative for dysuria and hematuria.   Musculoskeletal: Positive for back pain.   Skin: Positive for wound.   Neurological: Positive for weakness.   Psychiatric/Behavioral: Negative for confusion.     Objective:     Vital Signs (Most Recent):  Temp: 96.6 °F (35.9 °C) (04/20/18 0737)  Pulse: 88 (04/20/18 0737)  Resp: 18 (04/20/18 0737)  BP: 108/78 (04/20/18 0737)  SpO2: 96 % (04/20/18 0737) Vital Signs " (24h Range):  Temp:  [96.4 °F (35.8 °C)-97.6 °F (36.4 °C)] 96.6 °F (35.9 °C)  Pulse:  [77-88] 88  Resp:  [18-20] 18  SpO2:  [90 %-98 %] 96 %  BP: (101-110)/(69-80) 108/78     Weight: 39.6 kg (87 lb 4.8 oz)  Body mass index is 12.89 kg/m².  Body surface area is 1.39 meters squared.      Intake/Output Summary (Last 24 hours) at 04/20/18 0854  Last data filed at 04/20/18 0530   Gross per 24 hour   Intake          1585.83 ml   Output             1000 ml   Net           585.83 ml       Physical Exam   Constitutional: He is oriented to person, place, and time. He appears cachectic. No distress.   HENT:   Head: Normocephalic and atraumatic.   Eyes: EOM are normal.   Neck: Neck supple. No JVD present.   Cardiovascular: Normal rate, regular rhythm, normal heart sounds and intact distal pulses.    No murmur heard.  He has a port on his anterior chest wall.      Pulmonary/Chest: No respiratory distress. He has no wheezes.   Decreased breath sounds bilaterally.      Abdominal: Soft. He exhibits no distension. There is no tenderness.   Musculoskeletal: He exhibits no edema.   Neurological: He is alert and oriented to person, place, and time. A sensory deficit (feet numbness ) is present.   His motor power on his feet +2 maximum and it is worse on the left side with sever muscle atrophy.   Legs and thighs are +3.   Upper extremities +3.    Skin: Skin is warm. He is not diaphoretic.   He has skin breakdown on his tail bone.    Psychiatric: He has a normal mood and affect.   Vitals reviewed.      Significant Labs:   CBC:     Recent Labs  Lab 04/19/18  0508   WBC 12.12   HGB 13.0*   HCT 38.0*   PLT 60*    and CMP:     Recent Labs  Lab 04/19/18  0508      K 4.0   *   CO2 20*   GLU 91   BUN 47*   CREATININE 0.5   CALCIUM 7.3*   PROT 4.4*   ALBUMIN 1.8*   BILITOT 0.8   ALKPHOS 105   AST 29   ALT 78*   ANIONGAP 5*   EGFRNONAA >60.0       Diagnostic Results:  MRI: spine. no spinal stenosis    Assessment/Plan:     *  Hypoglycemia    - Patient BS was 20 on EMS arrival. He had full recovery of AMS after glucose administration  - Etiology may be related malnutrition, malignancy, sepsis    - His TSH is low. T4 is normal  - AM cortisol 29.5  - AM glucose 79g/dl  - increased his D5 1/2NS to 100cc/hr  - We are unable to wean him off the drip.   - He couldn't tolerate the steroids.   - Will dc the drip once the family is ready.           Sepsis    Patient presented with hypoglycemia and hypothermia. His WBC ~14 K but it has been in this range for weeks. He is not tachycardic. His BP is on the lower side.   - Source could be skin.   - Blood cultures are sent, will follow results.   - CXR with no clear consolidation or effusion.   - UA is clear.   - he received one dose of Zosyn in the ED. Will switch to cefepime 2 g every 12 hrs.   - 4/16: His temp is back to normal. His cultures are NGT.   - 4/19: will d/c cipro as we are transitioning to comfort care.             Weakness    He has generalized weakness related to his sever malnutrition state. His weakness is more prominent in the lower extremities.   - MRI of the spine is not consistent with any cause of weakness.         Metastatic lung cancer (metastasis from lung to other site)    Patient with recent diagnosis of lung cancer. He started Keytruda therapy 3 weeks ago. He follows with Dr. Meadows.   - for more details see HPI.         Severe malnutrition    He has trouble swallowing anything but liquids.   - Nutrition is consulted, appreciate recs  - He passed SLP evaluation but still choking on his medications.         Decubitus ulcer of sacral region    - small sacral bed sore  - poor wound healing due to malnutrition  - wound care consulted, appreciate recs                 Marcelo Mills MD  Hematology/Oncology  [facility]    Attending Addendum:  The patient was seen, examined, and discussed on rounds with the team.  I agree with the assessment and plan as  outlined for Teddy Caballero.  Patient appears comfortable today.  Discussion with multiple family members including his wife today concerning next steps.  All are in agreement with discontinuing D5 drip.  Will likely not survive once this is discontinued and comfort measures have been initiated.  All questions were answered and she is agreeable with this plan.    Praveen Paris DO, FACP  Hematology & Oncology  [address] [place] [address]  ph. [phone]  Fax. [phone]

## 2018-04-20 NOTE — PLAN OF CARE
Problem: Patient Care Overview  Goal: Plan of Care Review  Outcome: Ongoing (interventions implemented as appropriate)  Pt remained free from falls during shift. Bedfast. On comfort care. PRN morphine given. One loose stool. Pt will not take anything by mouth. AAOx4. Bed locked and in lowest position. Bed rails up x2. Family at bedside. All questions answered. Will continue to monitor.

## 2018-04-20 NOTE — PROGRESS NOTES
Follow up on sacral pressure injury.  Offered to perform sacral wound care but was requested not to disturb as family is on jeter at bedside. Team is planning to stop D5W infusion and patient will likely not survive once discontinued. Placing on comfort care measures.  Shashi Herrera RN CWON  [id number]

## 2018-04-20 NOTE — SUBJECTIVE & OBJECTIVE
Interval History: patient is more tired looking and still on D5 drip. Will transition to comfort care and dc the drip once his family is around.     Oncology Treatment Plan:   OP MELANOMA PEMBROLIZUMAB    Medications:  Continuous Infusions:   dextrose 5 % and 0.45 % NaCl 100 mL/hr at 04/20/18 0635     Scheduled Meds:   collagenase   Topical (Top) Daily    morphine  15 mg Oral BID    pantoprazole  40 mg Oral Daily     PRN Meds:albuterol-ipratropium 2.5mg-0.5mg/3mL, ALPRAZolam, dextrose 50%, dextrose 50%, glucagon (human recombinant), glucose, glucose, oxyCODONE-acetaminophen, sodium chloride 0.9%     Review of Systems   Constitutional: Positive for activity change, appetite change and fatigue. Negative for chills and fever.   HENT: Positive for trouble swallowing.    Respiratory: Positive for cough (mild). Negative for shortness of breath.    Cardiovascular: Negative for chest pain and leg swelling.   Gastrointestinal: Negative for abdominal distention, abdominal pain, blood in stool, nausea and vomiting.   Genitourinary: Negative for dysuria and hematuria.   Musculoskeletal: Positive for back pain.   Skin: Positive for wound.   Neurological: Positive for weakness.   Psychiatric/Behavioral: Negative for confusion.     Objective:     Vital Signs (Most Recent):  Temp: 96.6 °F (35.9 °C) (04/20/18 0737)  Pulse: 88 (04/20/18 0737)  Resp: 18 (04/20/18 0737)  BP: 108/78 (04/20/18 0737)  SpO2: 96 % (04/20/18 0737) Vital Signs (24h Range):  Temp:  [96.4 °F (35.8 °C)-97.6 °F (36.4 °C)] 96.6 °F (35.9 °C)  Pulse:  [77-88] 88  Resp:  [18-20] 18  SpO2:  [90 %-98 %] 96 %  BP: (101-110)/(69-80) 108/78     Weight: 39.6 kg (87 lb 4.8 oz)  Body mass index is 12.89 kg/m².  Body surface area is 1.39 meters squared.      Intake/Output Summary (Last 24 hours) at 04/20/18 0820  Last data filed at 04/20/18 0530   Gross per 24 hour   Intake          1585.83 ml   Output             1000 ml   Net           585.83 ml       Physical Exam    Constitutional: He is oriented to person, place, and time. He appears cachectic. No distress.   HENT:   Head: Normocephalic and atraumatic.   Eyes: EOM are normal.   Neck: Neck supple. No JVD present.   Cardiovascular: Normal rate, regular rhythm, normal heart sounds and intact distal pulses.    No murmur heard.  He has a port on his anterior chest wall.      Pulmonary/Chest: No respiratory distress. He has no wheezes.   Decreased breath sounds bilaterally.      Abdominal: Soft. He exhibits no distension. There is no tenderness.   Musculoskeletal: He exhibits no edema.   Neurological: He is alert and oriented to person, place, and time. A sensory deficit (feet numbness ) is present.   His motor power on his feet +2 maximum and it is worse on the left side with sever muscle atrophy.   Legs and thighs are +3.   Upper extremities +3.    Skin: Skin is warm. He is not diaphoretic.   He has skin breakdown on his tail bone.    Psychiatric: He has a normal mood and affect.   Vitals reviewed.      Significant Labs:   CBC:     Recent Labs  Lab 04/19/18  0508   WBC 12.12   HGB 13.0*   HCT 38.0*   PLT 60*    and CMP:     Recent Labs  Lab 04/19/18  0508      K 4.0   *   CO2 20*   GLU 91   BUN 47*   CREATININE 0.5   CALCIUM 7.3*   PROT 4.4*   ALBUMIN 1.8*   BILITOT 0.8   ALKPHOS 105   AST 29   ALT 78*   ANIONGAP 5*   EGFRNONAA >60.0       Diagnostic Results:  MRI: spine. no spinal stenosis

## 2018-04-20 NOTE — ASSESSMENT & PLAN NOTE
Malnutrition in the context of Chronic Illness/Injury and Social/Environmental Circumstances     Related to (etiology):  Lung cancer and picky eater- refused all RD suggestions    Signs and Symptoms (as evidenced by):  Energy Intake: <50% of estimated energy requirement for 1yr  Body Fat Depletion: severe depletion of orbitals, triceps and thoracic and lumbar region   Muscle Mass Depletion: severe depletion of temples, clavicle region, scapular region, interosseous muscle and lower extremities   Weight Loss:38.64% x 1 yr   Fluid Accumulation: mild      Interventions/Recommendations (treatment strategy):   TF Recs place see above    Nutrition Diagnosis Status:  Worsening

## 2018-04-20 NOTE — TREATMENT PLAN
Reviewed recent notes from primary team. Continues to have intermittent hypoglycemia while on dextrose gtt likely from extensive metastatic lung cancer. Current plans to pursue palliative care.  Please call us back if we can be of any additional help.    Emil Velazquez MD  Endocrinology Fellow

## 2018-04-20 NOTE — PROGRESS NOTES
" [facility]  Adult Nutrition  Progress Note    SUMMARY       Recommendations    Recommendation/Intervention: Pt is severely Malnourished. Full Liq not meeting need 2/2 to picky eating habit. Recommend TF. Isosource 1.5 @ 32ml/hr.  Provides 1152 kcals,  52g/Pro, and 598cc free fl. start with 10ml/hr and increase by 10ml q2hrs until goal rate is achieved. hold for residuals >300ml. Meets 100% EEN/EPN  Goals: nutrient intake > 75% EEN  Nutrition Goal Status: goal not met  Communication of RD Recs: reviewed with physician (POC)    Reason for Assessment    Reason for Assessment: RD follow-up  Diagnosis:  (Hypoglycemia )  Relevant Medical History: Lung cancer, HLD, HTN, NSTEMI  Interdisciplinary Rounds: attended  General Information Comments: Pt sleeping, sister and wife at bed side. per last encounter wife stated she was going to bring in food, as pt is very picky. Wife states she has not brought in food as pt has completely  stopped eating.  Nutrition Discharge Planning: Regular soft High Kcal diet w/po intake > 75%    Nutrition Risk Screen    Nutrition Risk Screen: dysphagia or difficulty swallowing    Nutrition/Diet History    Patient Reported Diet/Restrictions/Preferences: general  Food Preferences: limited  Do you have any cultural, spiritual, Restorationist conflicts, given your current situation?: none stated   Food Allergies: NKFA  Factors Affecting Nutritional Intake: behavioral (mealtime), chewing difficulties/inability to chew food, decreased appetite, difficulty/impaired swallowing    Anthropometrics    Temp: 96.6 °F (35.9 °C)  Height Method: Stated  Height: 5' 9.02" (175.3 cm)  Height (inches): 69.02 in  Weight Method: Bed Scale  Weight: 39.6 kg (87 lb 4.8 oz)  Weight (lb): 87.3 lb  Ideal Body Weight (IBW), Male: 160.12 lb  % Ideal Body Weight, Male (lb): 54.52 lb  BMI (Calculated): 12.9  BMI Grade: less than 16 protein-energy malnutrition grade III  Weight Loss: unintentional  Usual Body " Weight (UBW), k.54 kg  Weight Change Amount: 54 lb 15.7 oz  % Usual Body Weight: 61.49  % Weight Change From Usual Weight: -38.64 %       Lab/Procedures/Meds    Pertinent Labs Reviewed: reviewed  Lab Results   Component Value Date    WBC 12.12 2018     BMP  Lab Results   Component Value Date     2018    K 4.0 2018     (H) 2018    CO2 20 (L) 2018    BUN 47 (H) 2018    CREATININE 0.5 2018    CALCIUM 7.3 (L) 2018    ANIONGAP 5 (L) 2018    ESTGFRAFRICA >60.0 2018    EGFRNONAA >60.0 2018     Lab Results   Component Value Date    CALCIUM 7.3 (L) 2018    PHOS 2.6 (L) 2018     Lab Results   Component Value Date    ALBUMIN 1.8 (L) 2018       Pertinent Medications Reviewed: reviewed   collagenase   Topical (Top) Daily    morphine  15 mg Oral BID      dextrose 5 % and 0.45 % NaCl 100 mL/hr at 18 0635       Physical Findings/Assessment    Overall Physical Appearance: brittle hair, advanced age, generalized wasting, lethargic, loss of muscle mass, loss of subcutaneous fat, underweight, weak  Oral/Mouth Cavity: WDL  Skin: intact    Estimated/Assessed Needs    Weight Used For Calorie Calculations: 39.6 kg (87 lb 4.8 oz)  Energy Calorie Requirements (kcal): 990-1188  Energy Need Method: Kcal/kg  Protein Requirements: 48-56  Weight Used For Protein Calculations: 39.6 kg (87 lb 4.8 oz)  Fluid Requirements (mL): Per MD  Fluid Need Method: RDA Method  RDA Method (mL): 990         Nutrition Prescription Ordered    Current Diet Order: Full Liq  Oral Nutrition Supplement: Denies    Evaluation of Received Nutrient/Fluid Intake    Other Calories (kcal): 306  IV Fluid (mL): 2400  I/O: +3.5L Since admit  Energy Calories Required: not meeting needs  Protein Required: not meeting needs  Fluid Required: not meeting needs  Comments: LBM:18  % Intake of Estimated Energy Needs: 0 - 25 %  % Meal Intake: 0 - 25 %    Nutrition  Risk    Level of Risk/Frequency of Follow-up: high     Assessment and Plan    Severe malnutrition    Malnutrition in the context of Chronic Illness/Injury and Social/Environmental Circumstances     Related to (etiology):  Lung cancer and picky eater- refused all RD suggestions    Signs and Symptoms (as evidenced by):  Energy Intake: <50% of estimated energy requirement for 1yr  Body Fat Depletion: severe depletion of orbital's, triceps and thoracic and lumbar region   Muscle Mass Depletion: severe depletion of temples, clavicle region, scapular region, interosseous muscle and lower extremities   Weight Loss:38.64% x 1 yr   Fluid Accumulation: mild      Interventions/Recommendations (treatment strategy):   TF Recs place see above    Nutrition Diagnosis Status:  Worsening                 Monitor and Evaluation    Food and Nutrient Intake: energy intake, food and beverage intake, enteral nutrition intake  Food and Nutrient Administration: diet order, enteral and parenteral nutrition administration  Knowledge/Beliefs/Attitudes: food and nutrition knowledge/skill  Physical Activity and Function: nutrition-related ADLs and IADLs  Anthropometric Measurements: weight, weight change  Biochemical Data, Medical Tests and Procedures:  (All labs)  Nutrition-Focused Physical Findings: overall appearance     Nutrition Follow-Up    RD Follow-up?: Yes

## 2018-04-21 NOTE — SIGNIFICANT EVENT
Called by nursing to present at bedside as patient had .      Patient did not respond to verbal or tactile stimulation. There was no pulse and no inspiratory movements. No heart sounds or breath sounds on auscultation of the precordium/chest. Pupils dilated and unresponsive to light.       Offered condolences to patient's wife and step-son present at bedside. Offered opportunity to ask questions.  called to bedside.    Time of death: 03:26    Dixon Blanco MD   PGY-3  Internal Medicine  Pager: [phone]

## 2018-04-21 NOTE — DISCHARGE SUMMARY
"[facility]  Hematology/Oncology  Discharge Summary      Patient Name: Teddy Caballero  MRN: [id number]  Admission Date: 4/15/2018  Hospital Length of Stay: 5 days  Discharge Date and Time:  04/21/2018 3:35 AM  Attending Physician: Praveen Paris DO, F*   Discharging Provider: Dixon Blanco MD  Primary Care Provider: Iliana Campa NP    HPI: 52 year old male with a history of lung cancer with metastasis to kidney and liver on Kytruda. He presented to the ED via EMS with AMS.   Over the past week he became more lethargic and weak. He became bedridden. He used to walk around with a walker. He reports decreased PO intake. On the morning of admission he was unresponsive according to his sister. He was opening his eyes and starring. She called EMS. On their arrival his BS was 21.  The patient was given 1 amp of D50 and spontaneously returned to his baseline and was conversant. He has a small bedsore on his tailbone area about a quarter in size. Her also reports large amount of foul smelling BM over the past few days.    He denies fever, chills, cough, nausea, vomiting or dysuria.   He got his 1st chemotherapy 3 weeks ago.     Oncologic history per last clinic note:   52 year old male to clinic for follow-up after recent biopsy with Dr. Nicholson. Pt referred from GI after evaluation for abd pain revealed what appeared to be metastatic disease throughout the abdomen, including adrenal, renal mass, LNs, and questionable liver met.       Bx c/w NSCLC - Adeno - [id number] showed no actionable mutations.  Awaiting PD-L1 status.       Notes 30 lb wt loss over the last few months, including 10 lbs in the last week or so.  Notes abdominal pain, constipation, nausea, etc.       PS = 1-2      2/23/18- CT Abdomen/Pelvis "Large bilateral adrenal masses with mesenteric lymphadenopathy highly concerning for metastatic disease. Bilateral hypodense renal lesions which appear to enhance highly concerning for " "neoplasm such as renal cell carcinoma. Consider further evaluation with CT of the abdomen with and without contrast renal mass protocol.    2 small ill-defined hepatic hypodensities which are not cystic and do not have the enhancing pattern of a hemangioma highly concerning for metastatic disease."     18- CT Chest " Spiculated left upper lobe mass measuring up to 6.7 cm concerning for primary malignancy.Supraclavicular and mediastinal lymphadenopathy suggestive of metastases.Bilateral adrenal masses and ill-defined hypodense hepatic and renal lesions are suggestive of metastatic disease, better visualized on recent CT abdomen pelvis of the 2018."    * No surgery found *     Hospital Course: In the ED he received one dose of Zosyn and D5 NS fluids. He is admitted to the medical oncology service for symptomatic hypoglycemia and hypothermia. Will continue his fluids and switch to Cefepime.   2018 patient was switched to D10 over night then switched back to D5 drip. His BS continues to be on the lower side.   Over the past 3 days his BS readings were ranging in the 60-80s on the D5 drip. Dr. Pena discussed end of life situation and comfort care measures with the patient and his wife at bedside. They agreed on DNR and comfort care measures once his family is around. Will continue D5 1/2NS at 100 cc/hr for now.  patient family was waiting for patient's brother to come before stopping the D5W drip. The patient  early morning of 2018 at 03:26.     Consults:   Consults         Status Ordering Provider     Inpatient consult to Endocrinology  Once     Provider:  (Not yet assigned)    Completed PARTH SPIVEY     Inpatient consult to Registered Dietitian/Nutritionist  Once     Provider:  (Not yet assigned)    Completed PARTH SPIVEY     Inpatient consult to Social Work/Case Management  Once     Provider:  (Not yet assigned)    Completed KAYLA PENA              Pending Diagnostic Studies:     " Procedure Component Value Units Date/Time    Insulin Like Growth Factor II [[id number]] Collected:  180    Order Status:  Sent Lab Status:  In process Updated:  18    Specimen:  Blood from Blood         Final Active Diagnoses:    Diagnosis Date Noted POA    PRINCIPAL PROBLEM:  Hypoglycemia [E16.2] 04/15/2018 Yes    Severe malnutrition [E43] 2018 Yes    Weakness [R53.1] 2018 Yes    Stage I pressure ulcer [L89.91] 2018 Yes    Unstageable pressure ulcer of sacral region [L89.150] 2018 Yes    Decubitus ulcer of sacral region [L89.159] 04/15/2018 Yes    Sepsis [A41.9] 04/15/2018 Yes    Metastatic lung cancer (metastasis from lung to other site) [C34.90] 04/15/2018 Yes      Problems Resolved During this Admission:    Diagnosis Date Noted Date Resolved POA    Altered mental status [R41.82] 04/15/2018 2018 Yes      Discharged Condition:     Disposition:     Medications:  None (patient  at medical facility)    Dixon Blanco MD  Hematology/Oncology  [facility]

## 2018-04-21 NOTE — NURSING
Notified MD and charge nurse that pt has no heartbeat or breath sounds. Pt pronounced dead at 0326. Offered condolences to patient's wife and step-son present at bedside.   notified.

## 2018-04-21 NOTE — ASSESSMENT & PLAN NOTE
Patient with recent diagnosis of lung cancer. He started Keytruda therapy 3 weeks ago. He follows with Dr. Meadows.   - for more details see HPI.

## 2018-04-24 NOTE — PHYSICIAN QUERY
PT Name: Teddy Caballero  MR #: [id number]    Physician Query Form - Neurological Condition Clarification       CDS/: Nicole Ford RN            Contact information: [phone]    This form is a permanent document in the medical record.     Query Date: April 24, 2018    By submitting this query, we are merely seeking further clarification of documentation. Please utilize your independent clinical judgment when addressing the question(s) below.    The Medical record contains the following:   Indicators   Supporting Clinical Findings Location in Medical Record   x AMS, Confusion, LOC, etc.  He presented to the ED via EMS with AMS.  H&P 4/15, Mult Hematology PNs   x Acute / Chronic Illness history of lung cancer with metastasis to kidney and liver on Kytruda    sepsis    hypoglycemia H&P 4/15   x Radiology Findings No evidence of intracranial metastatic disease. MRI 4/15    Electrolyte Imbalance      Medication     x Treatment He had full recovery after glucose administration. This could be related to his adrenal mass causing adrenal insufficiency.  Hematologu PN 4/16   x Other Altered mental status    - see hypoglycemia.      Hematology PN 4/16     Provider, please specify the diagnosis or diagnoses associated with above clinical findings.        [x ] Metabolic Encephalopathy    [  ] Toxic Encephalopathy    [  ] Other Encephalopathy    [  ] Unspecified Encephalopathy    [  ] Other (please specify): _____________________________________          Please document in your progress notes daily for the duration of treatment until resolved, and  include in your discharge summary.

## 2018-04-26 NOTE — PHYSICIAN QUERY
PT Name: Teddy Caballero  MR #: [id number]    Physician Query Form -Integumentary  Clarification     CDS/: Nicole Ford RN              Contact information: [phone]    This form is a permanent document in the medical record.     Query Date: April 26, 2018    By submitting this query, we are merely seeking further clarification of documentation. Please utilize your independent clinical judgment when addressing the question(s) below.    The Medical record contains the following:   Indicator  Supporting Clinical Findings Location in Medical Record   x Redness periwound area Wound care note 4/16   x Decubitus, Pressure Ulcer, etc. Decubitus ulcer sacral region H&P 4/15    Deep Tissue Injury     x Wound Care Consult Wound care consulted  H&P 4/15    Medication     x Treatment Cleanse with normal saline, hydrofiber, foam Wound care note 4/16   x Other Tissue loss description:  Full thickness,     Appearance: white, yellow, slough, moist, not granulating    Depth  0.2cm Wound care note 4/16     National Pressure Ulcer Advisory Panel (2007) Pressure Ulcer Definitions & Stages:    Stage I:  Intact skin with non-blanchable redness of a localized area usually over a bony prominence.   Stage II:  Partial thickness loss of dermis presenting as a shallow open ulcer with a red pink wound bed, without slough.   Stage III: Full thickness tissue loss. Subcutaneous fat may be visible but bone, tendon or muscle is not exposed. Slough may be present but does not obscure the depth of tissue loss. May include undermining and tunneling.   Stage IV: Full thickness tissue loss with exposed bone, tendon or muscle. Slough or eschar may be present on some parts of the wound bed. Often include undermining and tunneling.   Unstageable:   Full thickness tissue loss in which the base of the ulcer is covered by slough and/or eschar in the wound bed. Until enough slough and/or eschar is removed to expose the base of the wound, the true  depth, and therefore stage, cannot be determined.   Deep Tissue Injury: Purple or maroon localized area of discolored intact skin or blood-filled blister due to damage of underlying soft tissue from  pressure and/or shear.     Provider, please specify the stage of the sacral  decubitus ulcer above, found in the medical record      [  ] Decubitus (Pressure) Ulcer / Deep Tissue Injury (please specify site, laterality & stage)    Site Laterality Stage                                             [  ]Sacrum                          [  ]Other Site (please specify):          [  ] Other Integumentary Diagnosis (please specify): ___________________________________  [  ] Clinically Undetermined    Please document in your progress notes daily for the duration of treatment until resolved and include in your discharge summary.      Please send this form to a house staff who has been following this patient on daily basis. I (Dr. Dixon Blanco) have only cross-covered for this patient overnight and wrote his discharge summary after pronouncing him during my night shift.    Thanks

## 2018-04-27 NOTE — PHYSICIAN QUERY
PT Name: Teddy Caballero  MR #: [id number]    Physician Query Form -Integumentary  Clarification     CDS/: Nicole Ford RN             Contact information: [phone]    This form is a permanent document in the medical record.     Query Date: April 27, 2018    By submitting this query, we are merely seeking further clarification of documentation. Please utilize your independent clinical judgment when addressing the question(s) below.    The Medical record contains the following:   Indicator  Supporting Clinical Findings Location in Medical Record   x Redness periwound area Wound care note 4/16   x Decubitus, Pressure Ulcer, etc. Decubitus ulcer sacral region H&P 4/15    Deep Tissue Injury     x Wound Care Consult Wound care consulted  H&P 4/15    Medication     x Treatment Cleanse with normal saline, hydrofiber, foam Wound care note 4/16   x Other Tissue loss description:  Full thickness,      Appearance: white, yellow, slough, moist, not granulating     Depth  0.2cm Wound care note 4/16     National Pressure Ulcer Advisory Panel (2007) Pressure Ulcer Definitions & Stages:    Stage I:  Intact skin with non-blanchable redness of a localized area usually over a bony prominence.   Stage II:  Partial thickness loss of dermis presenting as a shallow open ulcer with a red pink wound bed, without slough.   Stage III: Full thickness tissue loss. Subcutaneous fat may be visible but bone, tendon or muscle is not exposed. Slough may be present but does not obscure the depth of tissue loss. May include undermining and tunneling.   Stage IV: Full thickness tissue loss with exposed bone, tendon or muscle. Slough or eschar may be present on some parts of the wound bed. Often include undermining and tunneling.   Unstageable:   Full thickness tissue loss in which the base of the ulcer is covered by slough and/or eschar in the wound bed. Until enough slough and/or eschar is removed to expose the base of the wound, the true  depth, and therefore stage, cannot be determined.   Deep Tissue Injury: Purple or maroon localized area of discolored intact skin or blood-filled blister due to damage of underlying soft tissue from  pressure and/or shear.     Provider, please specify stage of decubitus ulcer       [ x ] Decubitus (Pressure) Ulcer / Deep Tissue Injury (please specify site, laterality & stage)    Site Laterality Stage                                             [ x ]Sacrum     Stage 3                     [  ]Other Site (please specify):          [  ] Other Integumentary Diagnosis (please specify): ___________________________________  [  ] Clinically Undetermined    Please document in your progress notes daily for the duration of treatment until resolved and include in your discharge summary.

## 2018-05-06 LAB — INSULIN-LIKE GROWTH FACTOR 2: 51 NG/ML
